# Patient Record
Sex: MALE | Race: WHITE | NOT HISPANIC OR LATINO | Employment: FULL TIME | URBAN - METROPOLITAN AREA
[De-identification: names, ages, dates, MRNs, and addresses within clinical notes are randomized per-mention and may not be internally consistent; named-entity substitution may affect disease eponyms.]

---

## 2017-07-06 ENCOUNTER — HOSPITAL ENCOUNTER (EMERGENCY)
Facility: HOSPITAL | Age: 52
Discharge: HOME | End: 2017-07-06
Attending: EMERGENCY MEDICINE | Admitting: EMERGENCY MEDICINE

## 2017-07-06 VITALS
BODY MASS INDEX: 38.65 KG/M2 | DIASTOLIC BLOOD PRESSURE: 65 MMHG | RESPIRATION RATE: 18 BRPM | OXYGEN SATURATION: 91 % | TEMPERATURE: 98 F | WEIGHT: 270 LBS | SYSTOLIC BLOOD PRESSURE: 123 MMHG | HEART RATE: 83 BPM | HEIGHT: 70 IN

## 2017-07-06 DIAGNOSIS — S61.209A AVULSION OF SKIN OF FINGER, INITIAL ENCOUNTER: ICD-10-CM

## 2017-07-06 DIAGNOSIS — R52 PAIN: ICD-10-CM
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
## 2017-07-06 DIAGNOSIS — S22.32XA CLOSED FRACTURE OF ONE RIB OF LEFT SIDE, INITIAL ENCOUNTER: ICD-10-CM

## 2017-07-06 DIAGNOSIS — M54.9 BACK PAIN, UNSPECIFIED BACK LOCATION, UNSPECIFIED BACK PAIN LATERALITY, UNSPECIFIED CHRONICITY: ICD-10-CM

## 2017-07-06 DIAGNOSIS — S43.102A AC SEPARATION, LEFT, INITIAL ENCOUNTER: ICD-10-CM

## 2017-07-06 DIAGNOSIS — S61.412A LACERATION OF MULTIPLE SITES OF HAND AND FINGERS, LEFT, INITIAL ENCOUNTER: ICD-10-CM
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
## 2017-07-06 DIAGNOSIS — S61.219A LACERATION OF MULTIPLE SITES OF HAND AND FINGERS, LEFT, INITIAL ENCOUNTER: ICD-10-CM

## 2017-07-06 DIAGNOSIS — T07.XXXA MULTIPLE CONTUSIONS: ICD-10-CM

## 2017-07-06 LAB
ABO + RH BLD: NORMAL
ALBUMIN SERPL BCP-MCNC: 3.9 G/DL
ALP SERPL-CCNC: 94 U/L
ALT SERPL W/O P-5'-P-CCNC: 27 U/L
ANION GAP SERPL CALC-SCNC: 7 MMOL/L
APTT BLDCRRT: <21 SEC
AST SERPL-CCNC: 24 U/L
BACTERIA #/AREA URNS AUTO: ABNORMAL /HPF
BASOPHILS # BLD AUTO: 0.07 K/UL
BASOPHILS NFR BLD: 0.7 %
BILIRUB SERPL-MCNC: 0.5 MG/DL
BILIRUB UR QL STRIP: NEGATIVE
BLD GP AB SCN CELLS X3 SERPL QL: NORMAL
BUN SERPL-MCNC: 12 MG/DL (ref 6–30)
BUN SERPL-MCNC: 13 MG/DL
CALCIUM SERPL-MCNC: 9.6 MG/DL
CHLORIDE SERPL-SCNC: 110 MMOL/L (ref 95–110)
CHLORIDE SERPL-SCNC: 112 MMOL/L
CLARITY UR REFRACT.AUTO: ABNORMAL
CO2 SERPL-SCNC: 24 MMOL/L
COLOR UR AUTO: YELLOW
CREAT SERPL-MCNC: 1.3 MG/DL
CREAT SERPL-MCNC: 1.3 MG/DL (ref 0.5–1.4)
DIFFERENTIAL METHOD: ABNORMAL
EOSINOPHIL # BLD AUTO: 0.1 K/UL
EOSINOPHIL NFR BLD: 1.1 %
ERYTHROCYTE [DISTWIDTH] IN BLOOD BY AUTOMATED COUNT: 12.3 %
EST. GFR  (AFRICAN AMERICAN): >60 ML/MIN/1.73 M^2
EST. GFR  (NON AFRICAN AMERICAN): >60 ML/MIN/1.73 M^2
ETHANOL SERPL-MCNC: <10 MG/DL
GLUCOSE SERPL-MCNC: 101 MG/DL
GLUCOSE SERPL-MCNC: 101 MG/DL (ref 70–110)
GLUCOSE UR QL STRIP: NEGATIVE
HCT VFR BLD AUTO: 43.1 %
HCT VFR BLD CALC: 44 %PCV (ref 36–54)
HGB BLD-MCNC: 15 G/DL
HGB UR QL STRIP: ABNORMAL
HYALINE CASTS UR QL AUTO: 0 /LPF
INR PPP: 0.9
KETONES UR QL STRIP: NEGATIVE
LACTATE SERPL-SCNC: 1 MMOL/L
LEUKOCYTE ESTERASE UR QL STRIP: NEGATIVE
LYMPHOCYTES # BLD AUTO: 1.6 K/UL
LYMPHOCYTES NFR BLD: 15.3 %
MAGNESIUM SERPL-MCNC: 2 MG/DL
MCH RBC QN AUTO: 31.7 PG
MCHC RBC AUTO-ENTMCNC: 34.8 %
MCV RBC AUTO: 91 FL
MICROSCOPIC COMMENT: ABNORMAL
MONOCYTES # BLD AUTO: 0.8 K/UL
MONOCYTES NFR BLD: 7.6 %
NEUTROPHILS # BLD AUTO: 7.7 K/UL
NEUTROPHILS NFR BLD: 74.9 %
NITRITE UR QL STRIP: NEGATIVE
PH UR STRIP: 5 [PH] (ref 5–8)
PLATELET # BLD AUTO: 249 K/UL
PMV BLD AUTO: 9.9 FL
POC IONIZED CALCIUM: 1.2 MMOL/L (ref 1.06–1.42)
POC TCO2 (MEASURED): 23 MMOL/L (ref 23–29)
POTASSIUM BLD-SCNC: 4.6 MMOL/L (ref 3.5–5.1)
POTASSIUM SERPL-SCNC: 4.6 MMOL/L
PROT SERPL-MCNC: 7.3 G/DL
PROT UR QL STRIP: ABNORMAL
PROTHROMBIN TIME: 9.9 SEC
RBC # BLD AUTO: 4.73 M/UL
RBC #/AREA URNS AUTO: 5 /HPF (ref 0–4)
SAMPLE: NORMAL
SODIUM BLD-SCNC: 144 MMOL/L (ref 136–145)
SODIUM SERPL-SCNC: 143 MMOL/L
SP GR UR STRIP: >1.03 (ref 1–1.03)
SQUAMOUS #/AREA URNS AUTO: 0 /HPF
TROPONIN I SERPL DL<=0.01 NG/ML-MCNC: <0.006 NG/ML
URN SPEC COLLECT METH UR: ABNORMAL
UROBILINOGEN UR STRIP-ACNC: NEGATIVE EU/DL
WBC # BLD AUTO: 10.29 K/UL
WBC #/AREA URNS AUTO: 3 /HPF (ref 0–5)

## 2017-07-06 PROCEDURE — 12002 RPR S/N/AX/GEN/TRNK2.6-7.5CM: CPT | Mod: ,,, | Performed by: EMERGENCY MEDICINE

## 2017-07-06 PROCEDURE — 85730 THROMBOPLASTIN TIME PARTIAL: CPT

## 2017-07-06 PROCEDURE — 83605 ASSAY OF LACTIC ACID: CPT

## 2017-07-06 PROCEDURE — 86900 BLOOD TYPING SEROLOGIC ABO: CPT

## 2017-07-06 PROCEDURE — 99284 EMERGENCY DEPT VISIT MOD MDM: CPT | Mod: 25,,, | Performed by: EMERGENCY MEDICINE

## 2017-07-06 PROCEDURE — 86901 BLOOD TYPING SEROLOGIC RH(D): CPT

## 2017-07-06 PROCEDURE — 96376 TX/PRO/DX INJ SAME DRUG ADON: CPT

## 2017-07-06 PROCEDURE — 25000003 PHARM REV CODE 250: Performed by: EMERGENCY MEDICINE

## 2017-07-06 PROCEDURE — 83735 ASSAY OF MAGNESIUM: CPT

## 2017-07-06 PROCEDURE — 85610 PROTHROMBIN TIME: CPT

## 2017-07-06 PROCEDURE — 84484 ASSAY OF TROPONIN QUANT: CPT

## 2017-07-06 PROCEDURE — 81001 URINALYSIS AUTO W/SCOPE: CPT

## 2017-07-06 PROCEDURE — 80320 DRUG SCREEN QUANTALCOHOLS: CPT

## 2017-07-06 PROCEDURE — 12002 RPR S/N/AX/GEN/TRNK2.6-7.5CM: CPT

## 2017-07-06 PROCEDURE — 96374 THER/PROPH/DIAG INJ IV PUSH: CPT

## 2017-07-06 PROCEDURE — 80053 COMPREHEN METABOLIC PANEL: CPT

## 2017-07-06 PROCEDURE — 93010 ELECTROCARDIOGRAM REPORT: CPT | Mod: ,,, | Performed by: INTERNAL MEDICINE

## 2017-07-06 PROCEDURE — 85025 COMPLETE CBC W/AUTO DIFF WBC: CPT

## 2017-07-06 PROCEDURE — 99285 EMERGENCY DEPT VISIT HI MDM: CPT | Mod: 25

## 2017-07-06 PROCEDURE — 99900035 HC TECH TIME PER 15 MIN (STAT)

## 2017-07-06 PROCEDURE — 96372 THER/PROPH/DIAG INJ SC/IM: CPT

## 2017-07-06 PROCEDURE — 63600175 PHARM REV CODE 636 W HCPCS: Performed by: EMERGENCY MEDICINE

## 2017-07-06 PROCEDURE — 25500020 PHARM REV CODE 255: Performed by: EMERGENCY MEDICINE

## 2017-07-06 RX ORDER — HYDROCODONE BITARTRATE AND ACETAMINOPHEN 10; 325 MG/1; MG/1
1 TABLET ORAL EVERY 6 HOURS PRN
Qty: 12 TABLET | Refills: 0 | Status: SHIPPED | OUTPATIENT
Start: 2017-07-06 | End: 2018-04-19

## 2017-07-06 RX ORDER — MORPHINE SULFATE 2 MG/ML
4 INJECTION, SOLUTION INTRAMUSCULAR; INTRAVENOUS
Status: COMPLETED | OUTPATIENT
Start: 2017-07-06 | End: 2017-07-06

## 2017-07-06 RX ORDER — CYCLOBENZAPRINE HCL 5 MG
5 TABLET ORAL 3 TIMES DAILY PRN
Qty: 8 TABLET | Refills: 0 | Status: SHIPPED | OUTPATIENT
Start: 2017-07-06 | End: 2017-07-16

## 2017-07-06 RX ORDER — NAPROXEN 500 MG/1
500 TABLET ORAL
Status: COMPLETED | OUTPATIENT
Start: 2017-07-06 | End: 2017-07-06

## 2017-07-06 RX ORDER — MORPHINE SULFATE 2 MG/ML
6 INJECTION, SOLUTION INTRAMUSCULAR; INTRAVENOUS
Status: COMPLETED | OUTPATIENT
Start: 2017-07-06 | End: 2017-07-06

## 2017-07-06 RX ORDER — BACITRACIN ZINC 500 UNIT/G
OINTMENT IN PACKET (EA) TOPICAL
Status: COMPLETED | OUTPATIENT
Start: 2017-07-06 | End: 2017-07-06

## 2017-07-06 RX ORDER — IBUPROFEN 600 MG/1
600 TABLET ORAL EVERY 6 HOURS PRN
Qty: 20 TABLET | Refills: 0 | Status: ON HOLD | OUTPATIENT
Start: 2017-07-06 | End: 2018-05-12 | Stop reason: HOSPADM

## 2017-07-06 RX ORDER — BUPIVACAINE HYDROCHLORIDE 5 MG/ML
15 INJECTION, SOLUTION EPIDURAL; INTRACAUDAL
Status: COMPLETED | OUTPATIENT
Start: 2017-07-06 | End: 2017-07-06

## 2017-07-06 RX ADMIN — MORPHINE SULFATE 4 MG: 2 INJECTION, SOLUTION INTRAMUSCULAR; INTRAVENOUS at 06:07

## 2017-07-06 RX ADMIN — BACITRACIN ZINC: 500 OINTMENT TOPICAL at 10:07

## 2017-07-06 RX ADMIN — NAPROXEN 500 MG: 500 TABLET ORAL at 10:07

## 2017-07-06 RX ADMIN — MORPHINE SULFATE 6 MG: 2 INJECTION, SOLUTION INTRAMUSCULAR; INTRAVENOUS at 08:07

## 2017-07-06 RX ADMIN — IOHEXOL 100 ML: 350 INJECTION, SOLUTION INTRAVENOUS at 07:07

## 2017-07-06 RX ADMIN — BUPIVACAINE HYDROCHLORIDE 75 MG: 5 INJECTION, SOLUTION EPIDURAL; INTRACAUDAL; PERINEURAL at 09:07
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
## 2017-07-06 NOTE — ED NOTES
Patient arrives via EMS with HOB 30 degrees elevated on stretcher, hard - collar in place. HOB decr, flat with immed pain and severe SOB.
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
## 2018-04-19 ENCOUNTER — HOSPITAL ENCOUNTER (EMERGENCY)
Facility: HOSPITAL | Age: 53
Discharge: HOME | End: 2018-04-19
Payer: COMMERCIAL

## 2018-04-19 VITALS
OXYGEN SATURATION: 97 % | WEIGHT: 260 LBS | SYSTOLIC BLOOD PRESSURE: 133 MMHG | BODY MASS INDEX: 37.22 KG/M2 | HEART RATE: 99 BPM | RESPIRATION RATE: 20 BRPM | HEIGHT: 70 IN | TEMPERATURE: 98 F | DIASTOLIC BLOOD PRESSURE: 69 MMHG

## 2018-04-19 DIAGNOSIS — L03.221 CELLULITIS OF NECK: Primary | ICD-10-CM

## 2018-04-19 LAB
ALBUMIN SERPL BCP-MCNC: 3.8 G/DL
ALP SERPL-CCNC: 90 U/L
ALT SERPL W/O P-5'-P-CCNC: 23 U/L
ANION GAP SERPL CALC-SCNC: 7 MMOL/L
AST SERPL-CCNC: 14 U/L
BASOPHILS # BLD AUTO: 0.01 K/UL
BASOPHILS NFR BLD: 0.1 %
BILIRUB SERPL-MCNC: 0.7 MG/DL
BUN SERPL-MCNC: 17 MG/DL
CALCIUM SERPL-MCNC: 9.8 MG/DL
CHLORIDE SERPL-SCNC: 106 MMOL/L
CO2 SERPL-SCNC: 27 MMOL/L
CREAT SERPL-MCNC: 1.2 MG/DL
CRP SERPL-MCNC: 14.9 MG/L
DIFFERENTIAL METHOD: ABNORMAL
EOSINOPHIL # BLD AUTO: 0 K/UL
EOSINOPHIL NFR BLD: 0.2 %
ERYTHROCYTE [DISTWIDTH] IN BLOOD BY AUTOMATED COUNT: 13 %
ERYTHROCYTE [SEDIMENTATION RATE] IN BLOOD BY WESTERGREN METHOD: 9 MM/HR
EST. GFR  (AFRICAN AMERICAN): >60 ML/MIN/1.73 M^2
EST. GFR  (NON AFRICAN AMERICAN): >60 ML/MIN/1.73 M^2
GLUCOSE SERPL-MCNC: 165 MG/DL
HCT VFR BLD AUTO: 42 %
HGB BLD-MCNC: 14.7 G/DL
LYMPHOCYTES # BLD AUTO: 1.3 K/UL
LYMPHOCYTES NFR BLD: 13.3 %
MCH RBC QN AUTO: 31.5 PG
MCHC RBC AUTO-ENTMCNC: 35 G/DL
MCV RBC AUTO: 90 FL
MONOCYTES # BLD AUTO: 0.3 K/UL
MONOCYTES NFR BLD: 3 %
NEUTROPHILS # BLD AUTO: 8.2 K/UL
NEUTROPHILS NFR BLD: 83.4 %
PLATELET # BLD AUTO: 244 K/UL
PMV BLD AUTO: 9.6 FL
POTASSIUM SERPL-SCNC: 4.7 MMOL/L
PROLACTIN SERPL IA-MCNC: 4.9 NG/ML
PROT SERPL-MCNC: 7.1 G/DL
RBC # BLD AUTO: 4.67 M/UL
SODIUM SERPL-SCNC: 140 MMOL/L
WBC # BLD AUTO: 9.78 K/UL

## 2018-04-19 PROCEDURE — 99284 EMERGENCY DEPT VISIT MOD MDM: CPT

## 2018-04-19 PROCEDURE — 85651 RBC SED RATE NONAUTOMATED: CPT

## 2018-04-19 PROCEDURE — 25500020 PHARM REV CODE 255: Performed by: NURSE PRACTITIONER

## 2018-04-19 PROCEDURE — 25000003 PHARM REV CODE 250: Performed by: NURSE PRACTITIONER

## 2018-04-19 PROCEDURE — 86140 C-REACTIVE PROTEIN: CPT

## 2018-04-19 PROCEDURE — 80053 COMPREHEN METABOLIC PANEL: CPT

## 2018-04-19 PROCEDURE — 85025 COMPLETE CBC W/AUTO DIFF WBC: CPT

## 2018-04-19 PROCEDURE — 84146 ASSAY OF PROLACTIN: CPT

## 2018-04-19 RX ORDER — HYDROCODONE BITARTRATE AND ACETAMINOPHEN 10; 325 MG/1; MG/1
1 TABLET ORAL
Status: COMPLETED | OUTPATIENT
Start: 2018-04-19 | End: 2018-04-19

## 2018-04-19 RX ORDER — HYDROCODONE BITARTRATE AND ACETAMINOPHEN 10; 325 MG/1; MG/1
1 TABLET ORAL EVERY 4 HOURS PRN
Qty: 10 TABLET | Refills: 0 | Status: SHIPPED | OUTPATIENT
Start: 2018-04-19 | End: 2019-02-06 | Stop reason: SDUPTHER

## 2018-04-19 RX ORDER — CLINDAMYCIN HYDROCHLORIDE 300 MG/1
300 CAPSULE ORAL 3 TIMES DAILY
Qty: 30 CAPSULE | Refills: 0 | Status: SHIPPED | OUTPATIENT
Start: 2018-04-19 | End: 2018-04-29

## 2018-04-19 RX ADMIN — HYDROCODONE BITARTRATE AND ACETAMINOPHEN 1 TABLET: 10; 325 TABLET ORAL at 02:04

## 2018-04-19 RX ADMIN — IOHEXOL 75 ML: 350 INJECTION, SOLUTION INTRAVENOUS at 03:04

## 2018-04-19 NOTE — ED PROVIDER NOTES
Encounter Date: 4/19/2018       History     Chief Complaint   Patient presents with    Neck Pain     posterior. Pt reports redness, warmth to skin, saw his PCP yesterday for same and was given steroid and muscle relaxer     Mr Sol presents with complaints of neck swelling in the occipital region. The swelling seems to be worse at night and he reports feeling he couldn't breathe last night. He was seen three weeks ago in a separate ER and was given a course of Bactrim. There ws some improvement but yesterday the symptoms worsened. He saw his PCP yesterday and ws given a medrol pack and muscle relaxants. He reports a temperature last night of 101.0.          Review of patient's allergies indicates:  No Known Allergies  Past Medical History:   Diagnosis Date    High cholesterol     Hypertension      Past Surgical History:   Procedure Laterality Date    APPENDECTOMY      BACK SURGERY      FEMUR SURGERY      KNEE SURGERY       History reviewed. No pertinent family history.  Social History   Substance Use Topics    Smoking status: Never Smoker    Smokeless tobacco: Never Used    Alcohol use No     Review of Systems   Constitutional: Positive for fever. Negative for chills and diaphoresis.   HENT: Negative for congestion, ear discharge, ear pain, postnasal drip, sinus pressure and sore throat.    Respiratory: Negative for cough, chest tightness and shortness of breath.    Cardiovascular: Negative for chest pain and palpitations.   Gastrointestinal: Negative for abdominal distention, abdominal pain, diarrhea, nausea and vomiting.   Genitourinary: Negative for difficulty urinating.   Musculoskeletal: Positive for neck pain. Negative for myalgias.   Skin: Positive for color change. Negative for rash and wound.   Allergic/Immunologic: Negative for immunocompromised state.   Neurological: Negative for dizziness, light-headedness and headaches.   Hematological: Does not bruise/bleed easily.   Psychiatric/Behavioral:  Negative for behavioral problems and confusion.       Physical Exam     Initial Vitals [04/19/18 1320]   BP Pulse Resp Temp SpO2   133/69 99 20 98.2 °F (36.8 °C) 97 %      MAP       90.33         Physical Exam    Vitals reviewed.  Constitutional: He appears well-developed and well-nourished.   HENT:   Head: Normocephalic.   Right Ear: Tympanic membrane and ear canal normal.   Left Ear: Tympanic membrane and ear canal normal.   Nose: Nose normal.   Mouth/Throat: Uvula is midline and oropharynx is clear and moist. No oropharyngeal exudate, posterior oropharyngeal edema or posterior oropharyngeal erythema.   Eyes: Conjunctivae are normal. Pupils are equal, round, and reactive to light.   Neck: Normal range of motion. Erythema present. No tracheal deviation present. No neck rigidity.       Erythema and swelling as per drawing. No fluctuance.   Cardiovascular: Normal rate.   Pulmonary/Chest: Breath sounds normal. No respiratory distress.   Musculoskeletal: Normal range of motion.   Neurological: He is alert and oriented to person, place, and time.   Skin: Skin is warm and dry.   Psychiatric: He has a normal mood and affect. Thought content normal.         ED Course   Procedures  Labs Reviewed   C-REACTIVE PROTEIN - Abnormal; Notable for the following:        Result Value    CRP 14.9 (*)     All other components within normal limits   COMPREHENSIVE METABOLIC PANEL - Abnormal; Notable for the following:     Glucose 165 (*)     Anion Gap 7 (*)     All other components within normal limits   CBC W/ AUTO DIFFERENTIAL - Abnormal; Notable for the following:     MCH 31.5 (*)     Gran # (ANC) 8.2 (*)     Gran% 83.4 (*)     Lymph% 13.3 (*)     Mono% 3.0 (*)     All other components within normal limits   SEDIMENTATION RATE, MANUAL   PROLACTIN            4:42 PM: Discussed with pt all pertinent ED information and results. Discussed pt dx and plan of tx. Gave pt all f/u and return to the ED instructions. All questions and concerns  were addressed at this time. Pt expresses understanding of information and instructions, and is comfortable with plan to discharge. Pt is stable for discharge.    I discussed with patient and/or family/caretaker that evaluation in the ED does not suggest any emergent or life threatening medical conditions requiring immediate intervention beyond what was provided in the ED, and I believe patient is safe for discharge.  Regardless, an unremarkable evaluation in the ED does not preclude the development or presence of a serious of life threatening condition. As such, patient was instructed to return immediately for any worsening or change in current symptoms.  Medical Decision Making:   Clinical Tests:   Lab Tests: Ordered and Reviewed  Radiological Study: Reviewed and Ordered                      Clinical Impression:   The encounter diagnosis was Cellulitis of neck.    Disposition:   Disposition: Discharged  Condition: Stable                        WM Davis  04/25/18 0811

## 2018-05-11 ENCOUNTER — HOSPITAL ENCOUNTER (INPATIENT)
Facility: HOSPITAL | Age: 53
LOS: 1 days | Discharge: HOME | DRG: 603 | End: 2018-05-12
Attending: HOSPITALIST | Admitting: HOSPITALIST
Payer: COMMERCIAL

## 2018-05-11 DIAGNOSIS — L03.116 CELLULITIS OF LEFT LOWER LEG: Primary | ICD-10-CM

## 2018-05-11 LAB
ANION GAP SERPL CALC-SCNC: 11 MMOL/L
BASOPHILS # BLD AUTO: 0.04 K/UL
BASOPHILS NFR BLD: 0.4 %
BUN SERPL-MCNC: 14 MG/DL
CALCIUM SERPL-MCNC: 9.3 MG/DL
CHLORIDE SERPL-SCNC: 109 MMOL/L
CO2 SERPL-SCNC: 20 MMOL/L
CREAT SERPL-MCNC: 1.3 MG/DL
CRP SERPL-MCNC: 6 MG/L
DIFFERENTIAL METHOD: ABNORMAL
EOSINOPHIL # BLD AUTO: 0.5 K/UL
EOSINOPHIL NFR BLD: 4.7 %
ERYTHROCYTE [DISTWIDTH] IN BLOOD BY AUTOMATED COUNT: 13.1 %
ERYTHROCYTE [SEDIMENTATION RATE] IN BLOOD BY WESTERGREN METHOD: 36 MM/HR
EST. GFR  (AFRICAN AMERICAN): >60 ML/MIN/1.73 M^2
EST. GFR  (NON AFRICAN AMERICAN): >60 ML/MIN/1.73 M^2
GLUCOSE SERPL-MCNC: 122 MG/DL
HCT VFR BLD AUTO: 39.8 %
HGB BLD-MCNC: 13.9 G/DL
LACTATE SERPL-SCNC: 1.7 MMOL/L
LYMPHOCYTES # BLD AUTO: 2.8 K/UL
LYMPHOCYTES NFR BLD: 27.8 %
MCH RBC QN AUTO: 31.3 PG
MCHC RBC AUTO-ENTMCNC: 34.9 G/DL
MCV RBC AUTO: 90 FL
MONOCYTES # BLD AUTO: 0.6 K/UL
MONOCYTES NFR BLD: 6.1 %
NEUTROPHILS # BLD AUTO: 6.1 K/UL
NEUTROPHILS NFR BLD: 61 %
PLATELET # BLD AUTO: 239 K/UL
PMV BLD AUTO: 9.6 FL
POTASSIUM SERPL-SCNC: 4.5 MMOL/L
RBC # BLD AUTO: 4.44 M/UL
SODIUM SERPL-SCNC: 140 MMOL/L
WBC # BLD AUTO: 10.02 K/UL

## 2018-05-11 PROCEDURE — 87040 BLOOD CULTURE FOR BACTERIA: CPT | Mod: 59

## 2018-05-11 PROCEDURE — 63600175 PHARM REV CODE 636 W HCPCS: Performed by: PHYSICIAN ASSISTANT

## 2018-05-11 PROCEDURE — 80048 BASIC METABOLIC PNL TOTAL CA: CPT

## 2018-05-11 PROCEDURE — 83605 ASSAY OF LACTIC ACID: CPT

## 2018-05-11 PROCEDURE — 11000001 HC ACUTE MED/SURG PRIVATE ROOM

## 2018-05-11 PROCEDURE — 25000003 PHARM REV CODE 250: Performed by: PHYSICIAN ASSISTANT

## 2018-05-11 PROCEDURE — 86141 C-REACTIVE PROTEIN HS: CPT

## 2018-05-11 PROCEDURE — 96366 THER/PROPH/DIAG IV INF ADDON: CPT

## 2018-05-11 PROCEDURE — 99284 EMERGENCY DEPT VISIT MOD MDM: CPT | Mod: 25

## 2018-05-11 PROCEDURE — 96365 THER/PROPH/DIAG IV INF INIT: CPT

## 2018-05-11 PROCEDURE — 85651 RBC SED RATE NONAUTOMATED: CPT

## 2018-05-11 PROCEDURE — 86140 C-REACTIVE PROTEIN: CPT

## 2018-05-11 PROCEDURE — 85610 PROTHROMBIN TIME: CPT

## 2018-05-11 PROCEDURE — 85025 COMPLETE CBC W/AUTO DIFF WBC: CPT

## 2018-05-11 RX ORDER — ONDANSETRON 8 MG/1
8 TABLET, ORALLY DISINTEGRATING ORAL EVERY 8 HOURS PRN
Status: DISCONTINUED | OUTPATIENT
Start: 2018-05-12 | End: 2018-05-12 | Stop reason: HOSPADM

## 2018-05-11 RX ORDER — HYDROCODONE BITARTRATE AND ACETAMINOPHEN 10; 325 MG/1; MG/1
1 TABLET ORAL EVERY 6 HOURS PRN
Status: DISCONTINUED | OUTPATIENT
Start: 2018-05-12 | End: 2018-05-12 | Stop reason: HOSPADM

## 2018-05-11 RX ORDER — GLUCAGON 1 MG
1 KIT INJECTION
Status: DISCONTINUED | OUTPATIENT
Start: 2018-05-12 | End: 2018-05-12 | Stop reason: HOSPADM

## 2018-05-11 RX ORDER — ACETAMINOPHEN 325 MG/1
650 TABLET ORAL EVERY 4 HOURS PRN
Status: DISCONTINUED | OUTPATIENT
Start: 2018-05-12 | End: 2018-05-12 | Stop reason: HOSPADM

## 2018-05-11 RX ORDER — SODIUM CHLORIDE 0.9 % (FLUSH) 0.9 %
5 SYRINGE (ML) INJECTION
Status: DISCONTINUED | OUTPATIENT
Start: 2018-05-12 | End: 2018-05-12 | Stop reason: HOSPADM

## 2018-05-11 RX ORDER — ONDANSETRON 2 MG/ML
4 INJECTION INTRAMUSCULAR; INTRAVENOUS EVERY 8 HOURS PRN
Status: DISCONTINUED | OUTPATIENT
Start: 2018-05-12 | End: 2018-05-12 | Stop reason: HOSPADM

## 2018-05-11 RX ORDER — HYDROCODONE BITARTRATE AND ACETAMINOPHEN 5; 325 MG/1; MG/1
1 TABLET ORAL EVERY 6 HOURS PRN
Status: DISCONTINUED | OUTPATIENT
Start: 2018-05-12 | End: 2018-05-12 | Stop reason: HOSPADM

## 2018-05-11 RX ORDER — MORPHINE SULFATE 4 MG/ML
4 INJECTION, SOLUTION INTRAMUSCULAR; INTRAVENOUS EVERY 4 HOURS PRN
Status: DISCONTINUED | OUTPATIENT
Start: 2018-05-12 | End: 2018-05-11

## 2018-05-11 RX ORDER — IPRATROPIUM BROMIDE AND ALBUTEROL SULFATE 2.5; .5 MG/3ML; MG/3ML
3 SOLUTION RESPIRATORY (INHALATION) EVERY 6 HOURS PRN
Status: DISCONTINUED | OUTPATIENT
Start: 2018-05-12 | End: 2018-05-12 | Stop reason: HOSPADM

## 2018-05-11 RX ORDER — IBUPROFEN 200 MG
24 TABLET ORAL
Status: DISCONTINUED | OUTPATIENT
Start: 2018-05-12 | End: 2018-05-12 | Stop reason: HOSPADM

## 2018-05-11 RX ORDER — IBUPROFEN 200 MG
16 TABLET ORAL
Status: DISCONTINUED | OUTPATIENT
Start: 2018-05-12 | End: 2018-05-12 | Stop reason: HOSPADM

## 2018-05-11 RX ADMIN — SODIUM CHLORIDE 1500 MG: 900 INJECTION, SOLUTION INTRAVENOUS at 09:05

## 2018-05-12 VITALS
HEIGHT: 70 IN | DIASTOLIC BLOOD PRESSURE: 73 MMHG | BODY MASS INDEX: 37.22 KG/M2 | OXYGEN SATURATION: 95 % | RESPIRATION RATE: 18 BRPM | HEART RATE: 76 BPM | SYSTOLIC BLOOD PRESSURE: 131 MMHG | WEIGHT: 260 LBS | TEMPERATURE: 97 F

## 2018-05-12 PROBLEM — I10 ESSENTIAL HYPERTENSION: Status: ACTIVE | Noted: 2018-05-12

## 2018-05-12 PROBLEM — G62.9 NEUROPATHY: Status: ACTIVE | Noted: 2018-05-12

## 2018-05-12 PROBLEM — M62.838 MUSCLE SPASMS OF LOWER EXTREMITY: Status: ACTIVE | Noted: 2018-05-12

## 2018-05-12 PROBLEM — E78.5 HYPERLIPIDEMIA: Status: ACTIVE | Noted: 2018-05-12

## 2018-05-12 LAB
ALBUMIN SERPL BCP-MCNC: 3.2 G/DL
AMPHET+METHAMPHET UR QL: NEGATIVE
ANION GAP SERPL CALC-SCNC: 8 MMOL/L
BARBITURATES UR QL SCN>200 NG/ML: NEGATIVE
BASOPHILS # BLD AUTO: 0.09 K/UL
BASOPHILS NFR BLD: 1.1 %
BENZODIAZ UR QL SCN>200 NG/ML: NEGATIVE
BUN SERPL-MCNC: 15 MG/DL
BZE UR QL SCN: NEGATIVE
CALCIUM SERPL-MCNC: 9 MG/DL
CANNABINOIDS UR QL SCN: NEGATIVE
CHLORIDE SERPL-SCNC: 111 MMOL/L
CO2 SERPL-SCNC: 22 MMOL/L
CREAT SERPL-MCNC: 1.2 MG/DL
CREAT UR-MCNC: 241.9 MG/DL
CRP SERPL-MCNC: 5.64 MG/L
DIFFERENTIAL METHOD: ABNORMAL
EOSINOPHIL # BLD AUTO: 0.6 K/UL
EOSINOPHIL NFR BLD: 6.7 %
ERYTHROCYTE [DISTWIDTH] IN BLOOD BY AUTOMATED COUNT: 13.2 %
EST. GFR  (AFRICAN AMERICAN): >60 ML/MIN/1.73 M^2
EST. GFR  (NON AFRICAN AMERICAN): >60 ML/MIN/1.73 M^2
GLUCOSE SERPL-MCNC: 110 MG/DL
HCT VFR BLD AUTO: 38.3 %
HGB BLD-MCNC: 13 G/DL
INR PPP: 0.9
LYMPHOCYTES # BLD AUTO: 2.7 K/UL
LYMPHOCYTES NFR BLD: 33.5 %
MAGNESIUM SERPL-MCNC: 2.1 MG/DL
MCH RBC QN AUTO: 30.7 PG
MCHC RBC AUTO-ENTMCNC: 33.9 G/DL
MCV RBC AUTO: 91 FL
METHADONE UR QL SCN>300 NG/ML: NEGATIVE
MONOCYTES # BLD AUTO: 0.7 K/UL
MONOCYTES NFR BLD: 8.2 %
NEUTROPHILS # BLD AUTO: 4.1 K/UL
NEUTROPHILS NFR BLD: 50.5 %
OPIATES UR QL SCN: NORMAL
PCP UR QL SCN>25 NG/ML: NEGATIVE
PHOSPHATE SERPL-MCNC: 3.6 MG/DL
PLATELET # BLD AUTO: 242 K/UL
PMV BLD AUTO: 9.3 FL
POTASSIUM SERPL-SCNC: 3.8 MMOL/L
PROCALCITONIN SERPL IA-MCNC: 0.05 NG/ML
PROTHROMBIN TIME: 9.8 SEC
RBC # BLD AUTO: 4.23 M/UL
SODIUM SERPL-SCNC: 141 MMOL/L
TOXICOLOGY INFORMATION: NORMAL
WBC # BLD AUTO: 8.19 K/UL

## 2018-05-12 PROCEDURE — 85025 COMPLETE CBC W/AUTO DIFF WBC: CPT

## 2018-05-12 PROCEDURE — 25000003 PHARM REV CODE 250: Performed by: INTERNAL MEDICINE

## 2018-05-12 PROCEDURE — 84145 PROCALCITONIN (PCT): CPT

## 2018-05-12 PROCEDURE — 25000003 PHARM REV CODE 250: Performed by: NURSE PRACTITIONER

## 2018-05-12 PROCEDURE — 80069 RENAL FUNCTION PANEL: CPT

## 2018-05-12 PROCEDURE — 80307 DRUG TEST PRSMV CHEM ANLYZR: CPT

## 2018-05-12 PROCEDURE — 83735 ASSAY OF MAGNESIUM: CPT

## 2018-05-12 PROCEDURE — 25000003 PHARM REV CODE 250: Performed by: HOSPITALIST

## 2018-05-12 PROCEDURE — 63600175 PHARM REV CODE 636 W HCPCS: Performed by: INTERNAL MEDICINE

## 2018-05-12 PROCEDURE — 36415 COLL VENOUS BLD VENIPUNCTURE: CPT

## 2018-05-12 RX ORDER — METOPROLOL SUCCINATE 25 MG/1
50 TABLET, EXTENDED RELEASE ORAL DAILY
COMMUNITY
End: 2019-02-06 | Stop reason: SDUPTHER

## 2018-05-12 RX ORDER — DIPHENHYDRAMINE HCL 25 MG
25 CAPSULE ORAL EVERY 6 HOURS PRN
Status: DISCONTINUED | OUTPATIENT
Start: 2018-05-12 | End: 2018-05-12 | Stop reason: HOSPADM

## 2018-05-12 RX ORDER — ASPIRIN 81 MG/1
81 TABLET ORAL DAILY
COMMUNITY
End: 2019-03-19

## 2018-05-12 RX ORDER — LISINOPRIL 20 MG/1
20 TABLET ORAL DAILY
COMMUNITY
End: 2019-02-06 | Stop reason: SDUPTHER

## 2018-05-12 RX ORDER — GABAPENTIN 100 MG/1
300 CAPSULE ORAL 3 TIMES DAILY
COMMUNITY
End: 2019-02-06 | Stop reason: SDUPTHER

## 2018-05-12 RX ORDER — TIZANIDINE 4 MG/1
4 TABLET ORAL 2 TIMES DAILY
COMMUNITY

## 2018-05-12 RX ORDER — GABAPENTIN 300 MG/1
300 CAPSULE ORAL 3 TIMES DAILY
Status: DISCONTINUED | OUTPATIENT
Start: 2018-05-12 | End: 2018-05-12 | Stop reason: HOSPADM

## 2018-05-12 RX ORDER — CLINDAMYCIN HYDROCHLORIDE 300 MG/1
300 CAPSULE ORAL EVERY 6 HOURS
Status: ON HOLD | COMMUNITY
End: 2018-05-12 | Stop reason: HOSPADM

## 2018-05-12 RX ORDER — MELOXICAM 7.5 MG/1
7.5 TABLET ORAL 2 TIMES DAILY
Status: ON HOLD | COMMUNITY
End: 2018-05-12

## 2018-05-12 RX ORDER — METOPROLOL SUCCINATE 50 MG/1
50 TABLET, EXTENDED RELEASE ORAL DAILY
Status: DISCONTINUED | OUTPATIENT
Start: 2018-05-12 | End: 2018-05-12 | Stop reason: HOSPADM

## 2018-05-12 RX ORDER — ATORVASTATIN CALCIUM 40 MG/1
40 TABLET, FILM COATED ORAL NIGHTLY
Status: DISCONTINUED | OUTPATIENT
Start: 2018-05-12 | End: 2018-05-12 | Stop reason: HOSPADM

## 2018-05-12 RX ORDER — TIZANIDINE 4 MG/1
4 TABLET ORAL 2 TIMES DAILY
Status: DISCONTINUED | OUTPATIENT
Start: 2018-05-12 | End: 2018-05-12 | Stop reason: HOSPADM

## 2018-05-12 RX ORDER — ASPIRIN 81 MG/1
81 TABLET ORAL DAILY
Status: DISCONTINUED | OUTPATIENT
Start: 2018-05-12 | End: 2018-05-12 | Stop reason: HOSPADM

## 2018-05-12 RX ORDER — SULFAMETHOXAZOLE AND TRIMETHOPRIM 800; 160 MG/1; MG/1
1 TABLET ORAL 2 TIMES DAILY
Qty: 14 TABLET | Refills: 0 | Status: SHIPPED | OUTPATIENT
Start: 2018-05-12 | End: 2018-05-19

## 2018-05-12 RX ORDER — AMITRIPTYLINE HYDROCHLORIDE 25 MG/1
25 TABLET, FILM COATED ORAL NIGHTLY
Status: DISCONTINUED | OUTPATIENT
Start: 2018-05-12 | End: 2018-05-12 | Stop reason: HOSPADM

## 2018-05-12 RX ORDER — ATORVASTATIN CALCIUM 40 MG/1
40 TABLET, FILM COATED ORAL NIGHTLY
COMMUNITY
End: 2019-02-06 | Stop reason: SDUPTHER

## 2018-05-12 RX ORDER — MELOXICAM 7.5 MG/1
7.5 TABLET ORAL DAILY PRN
Start: 2018-05-12 | End: 2019-02-06 | Stop reason: SDUPTHER

## 2018-05-12 RX ORDER — LISINOPRIL 20 MG/1
20 TABLET ORAL DAILY
Status: DISCONTINUED | OUTPATIENT
Start: 2018-05-12 | End: 2018-05-12 | Stop reason: HOSPADM

## 2018-05-12 RX ORDER — AMITRIPTYLINE HYDROCHLORIDE 25 MG/1
25 TABLET, FILM COATED ORAL NIGHTLY
COMMUNITY

## 2018-05-12 RX ADMIN — HYDROCODONE BITARTRATE AND ACETAMINOPHEN 1 TABLET: 10; 325 TABLET ORAL at 02:05

## 2018-05-12 RX ADMIN — ATORVASTATIN CALCIUM 40 MG: 40 TABLET, FILM COATED ORAL at 02:05

## 2018-05-12 RX ADMIN — GABAPENTIN 300 MG: 300 CAPSULE ORAL at 08:05

## 2018-05-12 RX ADMIN — METOPROLOL SUCCINATE 50 MG: 50 TABLET, EXTENDED RELEASE ORAL at 08:05

## 2018-05-12 RX ADMIN — TIZANIDINE 4 MG: 4 TABLET ORAL at 08:05

## 2018-05-12 RX ADMIN — LISINOPRIL 20 MG: 20 TABLET ORAL at 08:05

## 2018-05-12 RX ADMIN — AMITRIPTYLINE HYDROCHLORIDE 25 MG: 25 TABLET, FILM COATED ORAL at 02:05

## 2018-05-12 RX ADMIN — GABAPENTIN 300 MG: 300 CAPSULE ORAL at 02:05

## 2018-05-12 RX ADMIN — ASPIRIN 81 MG: 81 TABLET, COATED ORAL at 08:05

## 2018-05-12 RX ADMIN — HYDROCODONE BITARTRATE AND ACETAMINOPHEN 1 TABLET: 10; 325 TABLET ORAL at 08:05

## 2018-05-12 RX ADMIN — DIPHENHYDRAMINE HYDROCHLORIDE 25 MG: 25 CAPSULE ORAL at 02:05

## 2018-05-12 RX ADMIN — SODIUM CHLORIDE 1250 MG: 900 INJECTION, SOLUTION INTRAVENOUS at 08:05

## 2018-05-12 NOTE — ASSESSMENT & PLAN NOTE
- complicated, patients reports worsened even with PO clindamycin  - nonpurulent    - check XR to see if involving knee joint  - order ESR, CRP  - continue vancomycin with pharm to dose

## 2018-05-12 NOTE — DISCHARGE SUMMARY
"[facility] Hospital Medicine  Discharge Summary      Patient Name: Milton Sol  MRN: [id number]  Admission Date: 5/11/2018  Hospital Length of Stay: 1 days  Discharge Date and Time:  05/12/2018 1:56 PM  Attending Physician: Karan Denton MD   Discharging Provider: Mary Irby NP  Primary Care Provider: Primary Doctor No      HPI:   53M h/o HLD, HTN, and neuropathy presents with LLE infection.  He reports skin infection started 4 days ago when he noticed some redness, swelling and pain on his left kneecap.   He went to urgent care at [facility] ont he same day and was diagnosed with abscess s/p I/D.  He was prescribed clindamycin 300mg PO q6h, which patient reports he has been compliant on for the past 4 days.  Rash has extended to include his left shin.   Associated with subjective f/c.  No n/v, c/d, ab pain.  No other associated symptoms.  No other exacerbating or alleviating factors.   In Er, several excoriations are found on his left shin, even though patient denies scratching it.  VSS. WBC 10.. Patient was given vancomycin 1.5g bolus in ER.  Hospital medicine called for admission.     * No surgery found *      Hospital Course:   On 5/11 patient was admitted to Medicine secondary to LLE Cellulitis.  Patient denies any known injury, but reports "scratching my leg a lot after it got irritated from the sheets I slept on".  Also reports a h/o "picking at skin".  Patient reports I and D of "left knee at [facility] 4 days ago and taking 4 or so days of Clindamycin QID with no improvement".  [facility] records reviewed in Care Everywhere, and no wound culture is available for review.  He was started on Vancomycin here.  BC drawn and show NGTD.  According to wife at  and review of prior pictures, erythema and edema has markedly improved overnight.  Patient has full ROM of LLE.  Reports some pain with flexion, but is "tolerable".  No pus or drainage expressed from prior I and D site.  Minimal tenderness " to palpation.  Left knee soft to touch with No induration noted on exam.  Xray showed No bony or joint abnormality is seen.  No evidence of soft tissue air or foreign body.  Patient has remained afebrile with no leukocytosis.  Lactic acid 1.7.  Procalcitonin 0.05.  LLE US negative for DVT.  Case d/w Dr. Denton.  Patient seen and examined and deemed medically stable for DC home today.  He will complete 7 days of Bactrim DS and was also instructed to take Hibiclens bath daily x 7 days.  Also instructed to f/u with PCP within 3 days and verbalized + understanding.  Medications reconciled for DC.                  Consults:   Consults         Status Ordering Provider     Pharmacy to dose Vancomycin consult  Once     Provider:  (Not yet assigned)    Acknowledged ELEANOR NAVARRETE          No new Assessment & Plan notes have been filed under this hospital service since the last note was generated.  Service: Hospital Medicine    Final Active Diagnoses:    Diagnosis Date Noted POA    PRINCIPAL PROBLEM:  Cellulitis of left lower leg [L03.116] 05/11/2018 Yes    Hyperlipidemia [E78.5] 05/12/2018 Yes    Neuropathy [G62.9] 05/12/2018 Yes    Essential hypertension [I10] 05/12/2018 Yes    Muscle spasms of lower extremity [M62.838] 05/12/2018 Yes      Problems Resolved During this Admission:    Diagnosis Date Noted Date Resolved POA       Discharged Condition: stable    Disposition: Home or Self Care    Follow Up:  Follow-up Information     Nain Jin MD In 3 days.    Specialty:  Family Medicine  Why:  F/U with PCP for post hospital wound check in 3 days  Contact information:  [id number] [facility] [id number] [phone]                 Patient Instructions:     Diet Adult Regular     Activity as tolerated     Notify your health care provider if you experience any of the following:  temperature >100.4     Notify your health care provider if you experience any of the following:   severe uncontrolled pain     Notify your health care provider if you experience any of the following:  redness, tenderness, or signs of infection (pain, swelling, redness, odor or green/yellow discharge around incision site)     Notify your health care provider if you experience any of the following:  worsening rash         Significant Diagnostic Studies: Labs:   BMP:     Recent Labs  Lab 05/11/18 2122 05/12/18  0434   * 110    141   K 4.5 3.8    111*   CO2 20* 22*   BUN 14 15   CREATININE 1.3 1.2   CALCIUM 9.3 9.0   MG  --  2.1    and CBC     Recent Labs  Lab 05/11/18 2122 05/12/18  0434   WBC 10.02 8.19   HGB 13.9* 13.0*   HCT 39.8* 38.3*    242     Microbiology: Blood Cultures Pending; will f/u on results and notify patient if positive.     Pending Diagnostic Studies:     Procedure Component Value Units Date/Time    Phosphorus [[id number]]     Order Status:  Sent Lab Status:  No result     Specimen:  Blood from Blood          Imaging Results          X-Ray Knee 1 or 2 View Left (Final result)  Result time 05/11/18 23:46:17    Final result by Johnny Martinez MD (05/11/18 23:46:17)                 Impression:      Negative      Electronically signed by: Johnny Martinez MD  Date:    05/11/2018  Time:    23:46             Narrative:    EXAMINATION:  XR KNEE 1 OR 2 VIEW LEFT    CLINICAL HISTORY:  left knee swelling/cellulitis;    COMPARISON:  None    FINDINGS:  No bony or joint abnormality is seen.  No evidence of soft tissue air or foreign body.                                Medications:  Reconciled Home Medications:      Medication List      START taking these medications    sulfamethoxazole-trimethoprim 800-160mg 800-160 mg Tab  Commonly known as:  BACTRIM DS  Take 1 tablet by mouth 2 (two) times daily.        CHANGE how you take these medications    meloxicam 7.5 MG tablet  Commonly known as:  MOBIC  Take 1 tablet (7.5 mg total) by mouth daily as needed for Pain.  What changed:  · when  to take this  · reasons to take this        CONTINUE taking these medications    amitriptyline 25 MG tablet  Commonly known as:  ELAVIL  Take 25 mg by mouth every evening.     aspirin 81 MG EC tablet  Commonly known as:  ECOTRIN  Take 81 mg by mouth once daily.     atorvastatin 40 MG tablet  Commonly known as:  LIPITOR  Take 40 mg by mouth nightly.     gabapentin 100 MG capsule  Commonly known as:  NEURONTIN  Take 300 mg by mouth 3 (three) times daily.     hydrocodone-acetaminophen 10-325mg  mg Tab  Commonly known as:  NORCO  Take 1 tablet by mouth every 4 (four) hours as needed for Pain.     lisinopril 20 MG tablet  Commonly known as:  PRINIVIL,ZESTRIL  Take 20 mg by mouth once daily.     metoprolol succinate 25 MG 24 hr tablet  Commonly known as:  TOPROL-XL  Take 50 mg by mouth once daily.     multivitamin capsule  Take 1 capsule by mouth once daily.     tiZANidine 4 MG tablet  Commonly known as:  ZANAFLEX  Take 4 mg by mouth 2 (two) times daily.        STOP taking these medications    clindamycin 300 MG capsule  Commonly known as:  CLEOCIN     ibuprofen 600 MG tablet  Commonly known as:  ADVIL,MOTRIN            Indwelling Lines/Drains at time of discharge:   Lines/Drains/Airways          No matching active lines, drains, or airways          Time spent on the discharge of patient: 45 minutes  Patient was seen and examined on the date of discharge and determined to be suitable for discharge.         Mary Irby, JULIEN, ACNP-BC  Department of Hospital Medicine  [facility]

## 2018-05-12 NOTE — HPI
53M h/o HLD, HTN, and neuropathy presents with LLE infection.  He reports skin infection started 4 days ago when he noticed some redness, swelling and pain on his left kneecap.   He went to urgent care at [facility] ont he same day and was diagnosed with abscess s/p I/D.  He was prescribed clindamycin 300mg PO q6h, which patient reports he has been compliant on for the past 4 days.  Rash has extended to include his left shin.   Associated with subjective f/c.  No n/v, c/d, ab pain.  No other associated symptoms.  No other exacerbating or alleviating factors.   In Er, several excoriations are found on his left shin, even though patient denies scratching it.  VSS. WBC 10.. Patient was given vancomycin 1.5g bolus in ER.  Hospital medicine called for admission.

## 2018-05-12 NOTE — SUBJECTIVE & OBJECTIVE
Past Medical History:   Diagnosis Date    High cholesterol     Hypertension        Past Surgical History:   Procedure Laterality Date    APPENDECTOMY      BACK SURGERY      FEMUR SURGERY      KNEE SURGERY         Review of patient's allergies indicates:  No Known Allergies    No current facility-administered medications on file prior to encounter.      Current Outpatient Prescriptions on File Prior to Encounter   Medication Sig    hydrocodone-acetaminophen 10-325mg (NORCO)  mg Tab Take 1 tablet by mouth every 4 (four) hours as needed for Pain.    ibuprofen (ADVIL,MOTRIN) 600 MG tablet Take 1 tablet (600 mg total) by mouth every 6 (six) hours as needed for Pain.     Family History     None        Social History Main Topics    Smoking status: Former Smoker     Types: Cigarettes    Smokeless tobacco: Never Used    Alcohol use No    Drug use: No    Sexual activity: Not on file     Review of Systems   Constitutional: Positive for chills (subjective) and fever (subjective). Negative for activity change, appetite change, diaphoresis and fatigue.   HENT: Negative for facial swelling, sore throat, tinnitus and trouble swallowing.    Eyes: Negative for photophobia and visual disturbance.   Respiratory: Negative for apnea, cough, chest tightness, shortness of breath and wheezing.    Cardiovascular: Negative for chest pain, palpitations and leg swelling.   Gastrointestinal: Negative for abdominal distention, abdominal pain, constipation, diarrhea, nausea and vomiting.   Endocrine: Negative for polydipsia, polyphagia and polyuria.   Genitourinary: Negative for decreased urine volume, dysuria, flank pain, frequency and hematuria.   Musculoskeletal: Negative for arthralgias, back pain, joint swelling, myalgias and neck stiffness.   Skin: Positive for rash (LLE). Negative for pallor.   Allergic/Immunologic: Negative for immunocompromised state.   Neurological: Negative for dizziness, seizures, syncope, weakness,  numbness and headaches.   Psychiatric/Behavioral: Negative for confusion, hallucinations and suicidal ideas. The patient is not nervous/anxious.    All other systems reviewed and are negative.    Objective:     Vital Signs (Most Recent):  Temp: 99 °F (37.2 °C) (05/12/18 0011)  Pulse: 98 (05/12/18 0011)  Resp: 18 (05/12/18 0011)  BP: (!) 159/75 (05/12/18 0011)  SpO2: 97 % (05/12/18 0011) Vital Signs (24h Range):  Temp:  [98.8 °F (37.1 °C)-99 °F (37.2 °C)] 99 °F (37.2 °C)  Pulse:  [89-98] 98  Resp:  [18-20] 18  SpO2:  [97 %] 97 %  BP: (138-159)/(71-93) 159/75     Weight: 117.9 kg (260 lb)  Body mass index is 37.31 kg/m².    Physical Exam   Constitutional: He is oriented to person, place, and time. He appears well-developed and well-nourished. No distress.   HENT:   Head: Normocephalic and atraumatic.   Mouth/Throat: Oropharynx is clear and moist.   Eyes: Conjunctivae are normal. Pupils are equal, round, and reactive to light. No scleral icterus.   Neck: No JVD present. No thyromegaly present.   Cardiovascular: Regular rhythm.  Exam reveals no gallop and no friction rub.    No murmur heard.  Pulmonary/Chest: Effort normal and breath sounds normal. No respiratory distress. He has no wheezes. He has no rales.   Abdominal: Soft. Bowel sounds are normal. He exhibits no distension. There is no tenderness. There is no guarding.   Musculoskeletal: Normal range of motion.   Neurological: He is alert and oriented to person, place, and time. No cranial nerve deficit.   Skin: Skin is warm. Capillary refill takes 2 to 3 seconds. Rash (erythema of left knee cap and shin.  Several excoriations of left shin is present.  No joint swelling of left knee present. ) noted. He is not diaphoretic. No erythema.   Psychiatric: He has a normal mood and affect.   Nursing note and vitals reviewed.        CRANIAL NERVES     CN III, IV, VI   Pupils are equal, round, and reactive to light.       Significant Labs:   BMP:   Recent Labs  Lab  05/11/18 2122   *      K 4.5      CO2 20*   BUN 14   CREATININE 1.3   CALCIUM 9.3     CBC:   Recent Labs  Lab 05/11/18 2122   WBC 10.02   HGB 13.9*   HCT 39.8*        All pertinent labs within the past 24 hours have been reviewed.    Significant Imaging: I have reviewed all pertinent imaging results/findings within the past 24 hours.

## 2018-05-12 NOTE — HOSPITAL COURSE
"On 5/11 patient was admitted to Medicine secondary to LLE Cellulitis.  Patient denies any known injury, but reports "scratching my leg a lot after it got irritated from the sheets I slept on".  Also reports a h/o "picking at skin".  Patient reports I and D of "left knee at [facility] 4 days ago and taking 4 or so days of Clindamycin QID with no improvement".  [facility] records reviewed in Care Everywhere, and no wound culture is available for review.  He was started on Vancomycin here.  BC drawn and show NGTD.  According to wife at  and review of prior pictures, erythema and edema has markedly improved overnight.  Patient has full ROM of LLE.  Reports some pain with flexion, but is "tolerable".  No pus or drainage expressed from prior I and D site.  Minimal tenderness to palpation.  Left knee soft to touch with No induration noted on exam.  Xray showed No bony or joint abnormality is seen.  No evidence of soft tissue air or foreign body.  Patient has remained afebrile with no leukocytosis.  Lactic acid 1.7.  Procalcitonin.....  LLE US negative for DVT.  Case d/w Dr. Denton.  Patient seen and examined and deemed medically stable for DC home today.  He will complete 7 days of Bactrim DS and was also instructed to take Hibiclens bath daily x 7 days.  Also instructed to f/u with PCP within 3 days and verbalized + understanding.  Medications reconciled for DC.   "

## 2018-05-12 NOTE — PLAN OF CARE
Problem: Patient Care Overview  Goal: Plan of Care Review  Outcome: Outcome(s) achieved Date Met: 05/12/18  Discharge instructions reviewed with patient and spouse.   Medication sent to pharmacy.  Follow up appointments scheduled and discussed.  Questions answered. No further needs.

## 2018-05-12 NOTE — CONSULTS
Milton Sol [id number] is a 53 y.o. male who has been consulted for vancomycin dosing.  Dx: skin and soft tissue / goal trough 10-15  The patient has the following labs:     Date Creatinine (mg/dl)    BUN WBC Count   5/12/2018 Estimated Creatinine Clearance: 84.6 mL/min (based on SCr of 1.3 mg/dL). Lab Results   Component Value Date    BUN 14 05/11/2018     Lab Results   Component Value Date    WBC 10.02 05/11/2018      which calculates to an Estimated Creatinine Clearance: 84.6 mL/min (based on SCr of 1.3 mg/dL)..       Current weight is 117.9 kg (260 lb)  Initial load = 1500mg  The patient will be started on vancomycin at a dose of 1250 mg every 12 hours. Patient will be followed by pharmacy for changes in renal function, toxicity, and efficacy.  The vancomycin trough has been ordered for 5/13 at 08:45.      Thank you for allowing us to participate in this patient's care.     Alejandro Sam

## 2018-05-12 NOTE — H&P
[facility] Hospital Medicine  History & Physical    Patient Name: Milton Sol  MRN: [id number]  Admission Date: 5/11/2018  Attending Physician: Bull Hatfield MD  Primary Care Provider: Primary Doctor No         Patient information was obtained from patient and ER records.     Subjective:     Principal Problem:Cellulitis of left lower leg    Chief Complaint:   Chief Complaint   Patient presents with    Leg Pain     L knee to L foot.  Pt had abscess lanced at [facility] after hours 4 days ago and now L knee swollen/red. Multiple abrasions/rashes to LLE        HPI: 53M h/o HLD, HTN, and neuropathy presents with LLE infection.  He reports skin infection started 4 days ago when he noticed some redness, swelling and pain on his left kneecap.   He went to urgent care at [facility] ont he same day and was diagnosed with abscess s/p I/D.  He was prescribed clindamycin 300mg PO q6h, which patient reports he has been compliant on for the past 4 days.  Rash has extended to include his left shin.   Associated with subjective f/c.  No n/v, c/d, ab pain.  No other associated symptoms.  No other exacerbating or alleviating factors.   In Er, several excoriations are found on his left shin, even though patient denies scratching it.  VSS. WBC 10.. Patient was given vancomycin 1.5g bolus in ER.  Hospital medicine called for admission.     Past Medical History:   Diagnosis Date    High cholesterol     Hypertension        Past Surgical History:   Procedure Laterality Date    APPENDECTOMY      BACK SURGERY      FEMUR SURGERY      KNEE SURGERY         Review of patient's allergies indicates:  No Known Allergies    No current facility-administered medications on file prior to encounter.      Current Outpatient Prescriptions on File Prior to Encounter   Medication Sig    hydrocodone-acetaminophen 10-325mg (NORCO)  mg Tab Take 1 tablet by mouth every 4 (four) hours as needed for Pain.    ibuprofen (ADVIL,MOTRIN)  600 MG tablet Take 1 tablet (600 mg total) by mouth every 6 (six) hours as needed for Pain.     Family History     None        Social History Main Topics    Smoking status: Former Smoker     Types: Cigarettes    Smokeless tobacco: Never Used    Alcohol use No    Drug use: No    Sexual activity: Not on file     Review of Systems   Constitutional: Positive for chills (subjective) and fever (subjective). Negative for activity change, appetite change, diaphoresis and fatigue.   HENT: Negative for facial swelling, sore throat, tinnitus and trouble swallowing.    Eyes: Negative for photophobia and visual disturbance.   Respiratory: Negative for apnea, cough, chest tightness, shortness of breath and wheezing.    Cardiovascular: Negative for chest pain, palpitations and leg swelling.   Gastrointestinal: Negative for abdominal distention, abdominal pain, constipation, diarrhea, nausea and vomiting.   Endocrine: Negative for polydipsia, polyphagia and polyuria.   Genitourinary: Negative for decreased urine volume, dysuria, flank pain, frequency and hematuria.   Musculoskeletal: Negative for arthralgias, back pain, joint swelling, myalgias and neck stiffness.   Skin: Positive for rash (LLE). Negative for pallor.   Allergic/Immunologic: Negative for immunocompromised state.   Neurological: Negative for dizziness, seizures, syncope, weakness, numbness and headaches.   Psychiatric/Behavioral: Negative for confusion, hallucinations and suicidal ideas. The patient is not nervous/anxious.    All other systems reviewed and are negative.    Objective:     Vital Signs (Most Recent):  Temp: 99 °F (37.2 °C) (05/12/18 0011)  Pulse: 98 (05/12/18 0011)  Resp: 18 (05/12/18 0011)  BP: (!) 159/75 (05/12/18 0011)  SpO2: 97 % (05/12/18 0011) Vital Signs (24h Range):  Temp:  [98.8 °F (37.1 °C)-99 °F (37.2 °C)] 99 °F (37.2 °C)  Pulse:  [89-98] 98  Resp:  [18-20] 18  SpO2:  [97 %] 97 %  BP: (138-159)/(71-93) 159/75     Weight: 117.9 kg (260  lb)  Body mass index is 37.31 kg/m².    Physical Exam   Constitutional: He is oriented to person, place, and time. He appears well-developed and well-nourished. No distress.   HENT:   Head: Normocephalic and atraumatic.   Mouth/Throat: Oropharynx is clear and moist.   Eyes: Conjunctivae are normal. Pupils are equal, round, and reactive to light. No scleral icterus.   Neck: No JVD present. No thyromegaly present.   Cardiovascular: Regular rhythm.  Exam reveals no gallop and no friction rub.    No murmur heard.  Pulmonary/Chest: Effort normal and breath sounds normal. No respiratory distress. He has no wheezes. He has no rales.   Abdominal: Soft. Bowel sounds are normal. He exhibits no distension. There is no tenderness. There is no guarding.   Musculoskeletal: Normal range of motion.   Neurological: He is alert and oriented to person, place, and time. No cranial nerve deficit.   Skin: Skin is warm. Capillary refill takes 2 to 3 seconds. Rash (erythema of left knee cap and shin.  Several excoriations of left shin is present.  No joint swelling of left knee present. ) noted. He is not diaphoretic. No erythema.   Psychiatric: He has a normal mood and affect.   Nursing note and vitals reviewed.        CRANIAL NERVES     CN III, IV, VI   Pupils are equal, round, and reactive to light.       Significant Labs:   BMP:   Recent Labs  Lab 05/11/18 2122   *      K 4.5      CO2 20*   BUN 14   CREATININE 1.3   CALCIUM 9.3     CBC:   Recent Labs  Lab 05/11/18 2122   WBC 10.02   HGB 13.9*   HCT 39.8*        All pertinent labs within the past 24 hours have been reviewed.    Significant Imaging: I have reviewed all pertinent imaging results/findings within the past 24 hours.     Assessment/Plan:     * Cellulitis of left lower leg    - complicated, patients reports worsened even with PO clindamycin  - nonpurulent    - check XR to see if involving knee joint  - order ESR, CRP  - continue vancomycin with  pharm to dose            Muscle spasms of lower extremity    - continue tizandine at home dose          Essential hypertension    - controlled  - continue metoprolol and lisinopril at home dose          Neuropathy    - continue amitryptyline and gabapentin at home dose          Hyperlipidemia    - continue atorvastatin at home dose            VTE Risk Mitigation         Ordered     Place sequential compression device  Until discontinued      05/12/18 0142     IP VTE LOW RISK PATIENT  Once      05/11/18 2302             Bull Hatfield MD  Department of Hospital Medicine   [facility]

## 2018-05-12 NOTE — ED PROVIDER NOTES
SCRIBE #1 NOTE: ITammi, am scribing for, and in the presence of, WM Easley . I have scribed the HPI and ROS.     SCRIBE #2 NOTE: IGaviota, am scribing for, and in the presence of,  WM Easley. I have scribed the remaining portions of the note not scribed by Scribe #1.     History      Chief Complaint   Patient presents with    Leg Pain     L knee to L foot.  Pt had abscess lanced at [facility] after hours 4 days ago and now L knee swollen/red. Multiple abrasions/rashes to LLE       Review of patient's allergies indicates:  No Known Allergies     HPI   HPI    5/11/2018, 8:53 PM   History obtained from the patient      History of Present Illness: Milton Sol is a 53 y.o. male patient who presents to the Emergency Department for left leg/knee pain, redness, and swelling which onset gradually 4 days ago. Symptoms are constant in course  and moderate in degree. Pt was seen at [facility] 4 days ago for an abscess, had I&D , and was prescribed Clindamycin 300 ng PO q 6 hrs. Pt reports symptoms are progressively worsening despite being compliant with medication with redness, pain, and swelling extending above knee and down shin. Subjective fever/chills last night. No mitigating or exacerbating factors reported. Patient denies any N/V , DROM , numbness, and all other sxs at this time. No further complaints or concerns at this time.         Arrival mode:Personal vehicle    PCP: Primary Doctor No       Past Medical History:  Past Medical History:   Diagnosis Date    High cholesterol     Hypertension        Past Surgical History:  Past Surgical History:   Procedure Laterality Date    APPENDECTOMY      BACK SURGERY      FEMUR SURGERY      KNEE SURGERY           Family History:  No family history on file.    Social History:  Social History     Social History Main Topics    Smoking status: Never Smoker    Smokeless tobacco: Never Used    Alcohol use No    Drug use: No     "Sexual activity: Not on file       ROS   Review of Systems   Constitutional: Negative for chills and fever.   HENT: Negative for congestion and rhinorrhea.    Respiratory: Negative for shortness of breath.    Cardiovascular: Negative for chest pain.   Gastrointestinal: Negative for nausea and vomiting.   Genitourinary: Negative for dysuria and hematuria.   Musculoskeletal: Positive for joint swelling (left knee) and myalgias (left leg).   Skin:        (+) redness to LLE   Neurological: Negative for dizziness and headaches.       Physical Exam      Initial Vitals [05/11/18 2021]   BP Pulse Resp Temp SpO2   (!) 150/93 89 20 98.8 °F (37.1 °C) 97 %      MAP       112          Physical Exam  Nursing Notes and Vital Signs Reviewed.  Constitutional: Appears to be uncomfortable. Antalgic gait.   Head: Normocephalic.  Eyes: PERRL. EOM intact.   Neck: Supple.  Cardiovascular: Regular rate. Regular rhythm  Pulmonary/Chest: No respiratory distress.   Abdominal: Soft and non-distended. There is no tenderness.    Musculoskeletal: Moves all extremities. No septic joint.   Skin: Warm and dry. Abscess/cellulitis/edema to left knee and left lower leg - see image. No calf pain.   Neurological:  Alert, awake, and appropriate. AAOx3. 5/5 strength extremities x4.  Normal speech.  Psychiatric: Normal affect. Appropriate in content.        ED Course    Procedures  ED Vital Signs:  Vitals:    05/11/18 2021   BP: (!) 150/93   Pulse: 89   Resp: 20   Temp: 98.8 °F (37.1 °C)   TempSrc: Oral   SpO2: 97%   Weight: 117.9 kg (260 lb)   Height: 5' 10" (1.778 m)       Abnormal Lab Results:  Labs Reviewed   CBC W/ AUTO DIFFERENTIAL - Abnormal; Notable for the following:        Result Value    RBC 4.44 (*)     Hemoglobin 13.9 (*)     Hematocrit 39.8 (*)     MCH 31.3 (*)     All other components within normal limits   BASIC METABOLIC PANEL - Abnormal; Notable for the following:     CO2 20 (*)     Glucose 122 (*)     All other components within normal " limits   SEDIMENTATION RATE, MANUAL - Abnormal; Notable for the following:     Sed Rate 36 (*)     All other components within normal limits   CULTURE, BLOOD   CULTURE, BLOOD   LACTIC ACID, PLASMA   C-REACTIVE PROTEIN        All Lab Results:  Results for orders placed or performed during the hospital encounter of 05/11/18   CBC auto differential   Result Value Ref Range    WBC 10.02 3.90 - 12.70 K/uL    RBC 4.44 (L) 4.60 - 6.20 M/uL    Hemoglobin 13.9 (L) 14.0 - 18.0 g/dL    Hematocrit 39.8 (L) 40.0 - 54.0 %    MCV 90 82 - 98 fL    MCH 31.3 (H) 27.0 - 31.0 pg    MCHC 34.9 32.0 - 36.0 g/dL    RDW 13.1 11.5 - 14.5 %    Platelets 239 150 - 350 K/uL    MPV 9.6 9.2 - 12.9 fL    Gran # (ANC) 6.1 1.8 - 7.7 K/uL    Lymph # 2.8 1.0 - 4.8 K/uL    Mono # 0.6 0.3 - 1.0 K/uL    Eos # 0.5 0.0 - 0.5 K/uL    Baso # 0.04 0.00 - 0.20 K/uL    Gran% 61.0 38.0 - 73.0 %    Lymph% 27.8 18.0 - 48.0 %    Mono% 6.1 4.0 - 15.0 %    Eosinophil% 4.7 0.0 - 8.0 %    Basophil% 0.4 0.0 - 1.9 %    Differential Method Automated    Basic metabolic panel   Result Value Ref Range    Sodium 140 136 - 145 mmol/L    Potassium 4.5 3.5 - 5.1 mmol/L    Chloride 109 95 - 110 mmol/L    CO2 20 (L) 23 - 29 mmol/L    Glucose 122 (H) 70 - 110 mg/dL    BUN, Bld 14 6 - 20 mg/dL    Creatinine 1.3 0.5 - 1.4 mg/dL    Calcium 9.3 8.7 - 10.5 mg/dL    Anion Gap 11 8 - 16 mmol/L    eGFR if African American >60 >60 mL/min/1.73 m^2    eGFR if non African American >60 >60 mL/min/1.73 m^2   Lactic acid, plasma   Result Value Ref Range    Lactate (Lactic Acid) 1.7 0.5 - 2.2 mmol/L   Sedimentation rate, manual   Result Value Ref Range    Sed Rate 36 (H) 0 - 10 mm/Hr   C-reactive protein   Result Value Ref Range    CRP 6.0 0.0 - 8.2 mg/L         Imaging Results:  Imaging Results    None                 The Emergency Provider reviewed the vital signs and test results, which are outlined above.    ED Discussion     10:58 PM: Discussed case with Hilda ([facility]). Dr. Hatfield  agrees with current care and management of pt and accepts admission.   Admitting Service: Hospital medicine   Admitting Physician: Liu  Admit to: [facility]    11:00 PM: Re-evaluated pt. I have discussed test results, shared treatment plan, and the need for admission with patient and family at bedside. Pt and family express understanding at this time and agree with all information. All questions answered. Pt and family have no further questions or concerns at this time. Pt is ready for admit.        ED Medication(s):  Medications   vancomycin (VANCOCIN) 1,500 mg in sodium chloride 0.9% 250 mL IVPB (1,500 mg Intravenous New Bag 5/11/18 0040)   morphine injection 4 mg (not administered)   ondansetron disintegrating tablet 8 mg (not administered)       New Prescriptions    No medications on file             Medical Decision Making    Medical Decision Making:   Initial Assessment:   Abscess to left knee 4 days s/p I & D at outside facility, on Clindamycin and getting worse.   Clinical Tests:   Lab Tests: Ordered and Reviewed  ED Management:  Failed outpatient therapy              Scribe Attestation:   Scribe #1: I performed the above scribed service and the documentation accurately describes the services I performed. I attest to the accuracy of the note.    Attending:   Physician Attestation Statement for Scribe #1: I, WM Easley , personally performed the services described in this documentation, as scribed by Tammi Brown, in my presence, and it is both accurate and complete.       Scribe Attestation:   Scribe #2: I performed the above scribed service and the documentation accurately describes the services I performed. I attest to the accuracy of the note.    Attending Attestation:           Physician Attestation for Scribe:    Physician Attestation Statement for Scribe #2: I, WM Easley, reviewed documentation, as scribed by Gaviota Knapp in my presence, and it is both accurate and complete. I also  acknowledge and confirm the content of the note done by Scribe #1.          Clinical Impression       ICD-10-CM ICD-9-CM   1. Cellulitis of left lower leg L03.116 682.6       Disposition:   Disposition: Admitted  Condition: Evelia Whitten PA-C  05/11/18 9848

## 2018-05-12 NOTE — PLAN OF CARE
Problem: Patient Care Overview  Goal: Plan of Care Review  Outcome: Ongoing (interventions implemented as appropriate)  Reviewed POC, including indications and possible side effects of administered medications. Pt verbalized understanding and teach back. Spoke with spouse on phone and reviewed POC with her as well. Went over home medications with spouse as pt does not know what he takes. Redness to LLE noted and marked in ED. C/o chronic back pain. Pt quickly fell asleep before any PRN meds were ordered and is resting comfortably, easy to arouse. Oriented to room features, including call bell, spectra link phone, and hourly rounding. Pt verbalized understanding. Remains free from injury. Ambulated to BR independently upon arrival from ED.    12 hour chart check complete.

## 2018-05-13 NOTE — PLAN OF CARE
05/13/18 1556   Final Note   Assessment Type Final Discharge Note   Discharge Disposition Home   What phone number can be called within the next 1-3 days to see how you are doing after discharge? [phone]   Right Care Referral Info   Post Acute Recommendation No Care

## 2018-05-15 ENCOUNTER — PATIENT OUTREACH (OUTPATIENT)
Dept: ADMINISTRATIVE | Facility: CLINIC | Age: 53
End: 2018-05-15

## 2018-05-15 NOTE — PATIENT INSTRUCTIONS
Discharge Instructions for Cellulitis  You have been diagnosed with cellulitis. This is an infection in the deepest layer of the skin. In some cases, the infection also affects the muscle. Cellulitis is caused by bacteria. The bacteria can enter the body through broken skin. This can happen with a cut, scratch, animal bite, or an insect bite that has been scratched. You may have been treated in the hospital with antibiotics and fluids. You will likely be given a prescription for antibiotics to take at home. This sheet will help you take care of yourself at home.  Home care  When you are home:  · Take the prescribed antibiotic medicine you are given as directed until it is gone. Take it even if you feel better. It treats the infection and stops it from returning. Not taking all the medicine can make future infections hard to treat.  · Keep the infected area clean.  · When possible, raise the infected area above the level of your heart. This helps keep swelling down.  · Talk with your healthcare provider if you are in pain. Ask what kind of over-the-counter medicine you can take for pain.  · Apply clean bandages as advised.  · Take your temperature once a day for a week.  · Wash your hands often to prevent spreading the infection.  In the future, wash your hands before and after you touch cuts, scratches, or bandages. This will help prevent infection.   When to call your healthcare provider  Call your healthcare provider immediately if you have any of the following:  · Difficulty or pain when moving the joints above or below the infected area  · Discharge or pus draining from the area  · Fever of 100.4°F (38°C) or higher, or as directed by your healthcare provider  · Pain that gets worse in or around the infected   · Redness that gets worse in or around the infected area, particularly if the area of redness expands to a wider area  · Shaking chills  · Swelling of the infected area  · Vomiting   Date Last Reviewed:  8/1/2016  © 8701-9520 [employer]. [address] [place] [address], [place] [address]. All rights reserved. This information is not intended as a substitute for professional medical care. Always follow your healthcare professional's instructions.

## 2018-05-17 LAB
BACTERIA BLD CULT: NORMAL
BACTERIA BLD CULT: NORMAL

## 2019-02-06 ENCOUNTER — HOSPITAL ENCOUNTER (EMERGENCY)
Facility: HOSPITAL | Age: 54
Discharge: ANOTHER HOSPITAL | End: 2019-02-06
Attending: EMERGENCY MEDICINE
Payer: COMMERCIAL

## 2019-02-06 VITALS
DIASTOLIC BLOOD PRESSURE: 68 MMHG | WEIGHT: 287.06 LBS | OXYGEN SATURATION: 94 % | TEMPERATURE: 98 F | RESPIRATION RATE: 13 BRPM | BODY MASS INDEX: 41.19 KG/M2 | SYSTOLIC BLOOD PRESSURE: 118 MMHG | HEART RATE: 92 BPM

## 2019-02-06 DIAGNOSIS — M79.669 CALF PAIN: ICD-10-CM

## 2019-02-06 DIAGNOSIS — R07.9 CHEST PAIN, UNSPECIFIED TYPE: ICD-10-CM

## 2019-02-06 DIAGNOSIS — I60.9 SUBARACHNOID BLEED: ICD-10-CM

## 2019-02-06 DIAGNOSIS — I10 HYPERTENSION, UNSPECIFIED TYPE: ICD-10-CM

## 2019-02-06 DIAGNOSIS — I63.9 LEFT-SIDED CEREBROVASCULAR ACCIDENT (CVA): Primary | ICD-10-CM

## 2019-02-06 DIAGNOSIS — R56.9 SEIZURE: ICD-10-CM

## 2019-02-06 DIAGNOSIS — R53.1 RIGHT SIDED WEAKNESS: ICD-10-CM

## 2019-02-06 PROBLEM — I63.512 ACUTE ISCHEMIC LEFT MCA STROKE: Status: ACTIVE | Noted: 2019-02-06

## 2019-02-06 PROBLEM — F44.7 FUNCTIONAL NEUROLOGICAL SYMPTOM DISORDER WITH MIXED SYMPTOMS: Status: ACTIVE | Noted: 2019-02-06

## 2019-02-06 LAB
ALBUMIN SERPL BCP-MCNC: 4.2 G/DL
ALP SERPL-CCNC: 101 U/L
ALT SERPL W/O P-5'-P-CCNC: 40 U/L
ANION GAP SERPL CALC-SCNC: 12 MMOL/L
APTT BLDCRRT: 23.1 SEC
AST SERPL-CCNC: 27 U/L
BASOPHILS # BLD AUTO: 0.1 K/UL
BASOPHILS NFR BLD: 1.1 %
BILIRUB SERPL-MCNC: 0.6 MG/DL
BNP SERPL-MCNC: <10 PG/ML
BUN SERPL-MCNC: 15 MG/DL
CALCIUM SERPL-MCNC: 10.1 MG/DL
CHLORIDE SERPL-SCNC: 108 MMOL/L
CO2 SERPL-SCNC: 20 MMOL/L
CREAT SERPL-MCNC: 1.5 MG/DL
DIFFERENTIAL METHOD: NORMAL
EOSINOPHIL # BLD AUTO: 0.4 K/UL
EOSINOPHIL NFR BLD: 3.9 %
ERYTHROCYTE [DISTWIDTH] IN BLOOD BY AUTOMATED COUNT: 12.3 %
EST. GFR  (AFRICAN AMERICAN): >60 ML/MIN/1.73 M^2
EST. GFR  (NON AFRICAN AMERICAN): 52.4 ML/MIN/1.73 M^2
GLUCOSE SERPL-MCNC: 82 MG/DL
HCT VFR BLD AUTO: 46 %
HGB BLD-MCNC: 15.8 G/DL
INR PPP: 0.9
LYMPHOCYTES # BLD AUTO: 2.9 K/UL
LYMPHOCYTES NFR BLD: 32.7 %
MCH RBC QN AUTO: 30.9 PG
MCHC RBC AUTO-ENTMCNC: 34.3 G/DL
MCV RBC AUTO: 90 FL
MONOCYTES # BLD AUTO: 0.6 K/UL
MONOCYTES NFR BLD: 7.1 %
NEUTROPHILS # BLD AUTO: 4.9 K/UL
NEUTROPHILS NFR BLD: 54.9 %
PLATELET # BLD AUTO: 260 K/UL
PMV BLD AUTO: 10.3 FL
POCT GLUCOSE: 82 MG/DL (ref 70–110)
POTASSIUM SERPL-SCNC: 4.5 MMOL/L
PROT SERPL-MCNC: 7.3 G/DL
PROTHROMBIN TIME: 9.8 SEC
RBC # BLD AUTO: 5.12 M/UL
SODIUM SERPL-SCNC: 140 MMOL/L
TROPONIN I SERPL DL<=0.01 NG/ML-MCNC: <0.006 NG/ML
TSH SERPL DL<=0.005 MIU/L-ACNC: 1.99 UIU/ML
WBC # BLD AUTO: 8.96 K/UL

## 2019-02-06 PROCEDURE — 63600175 PHARM REV CODE 636 W HCPCS: Mod: ER | Performed by: EMERGENCY MEDICINE

## 2019-02-06 PROCEDURE — 99244 PR OFFICE CONSULTATION,LEVEL IV: ICD-10-PCS | Mod: GT,,, | Performed by: PSYCHIATRY & NEUROLOGY

## 2019-02-06 PROCEDURE — 80061 LIPID PANEL: CPT

## 2019-02-06 PROCEDURE — 84484 ASSAY OF TROPONIN QUANT: CPT | Mod: ER

## 2019-02-06 PROCEDURE — 85610 PROTHROMBIN TIME: CPT | Mod: ER

## 2019-02-06 PROCEDURE — 83880 ASSAY OF NATRIURETIC PEPTIDE: CPT | Mod: ER

## 2019-02-06 PROCEDURE — 99244 OFF/OP CNSLTJ NEW/EST MOD 40: CPT | Mod: GT,,, | Performed by: PSYCHIATRY & NEUROLOGY

## 2019-02-06 PROCEDURE — 96365 THER/PROPH/DIAG IV INF INIT: CPT | Mod: ER

## 2019-02-06 PROCEDURE — 96361 HYDRATE IV INFUSION ADD-ON: CPT | Mod: ER

## 2019-02-06 PROCEDURE — 25000003 PHARM REV CODE 250: Mod: ER | Performed by: EMERGENCY MEDICINE

## 2019-02-06 PROCEDURE — 99243 PR OFFICE CONSULTATION,LEVEL III: ICD-10-PCS | Mod: GT,G0,, | Performed by: PSYCHIATRY & NEUROLOGY

## 2019-02-06 PROCEDURE — 84443 ASSAY THYROID STIM HORMONE: CPT | Mod: ER

## 2019-02-06 PROCEDURE — 85025 COMPLETE CBC W/AUTO DIFF WBC: CPT | Mod: ER

## 2019-02-06 PROCEDURE — 93010 ELECTROCARDIOGRAM REPORT: CPT | Mod: ,,, | Performed by: INTERNAL MEDICINE

## 2019-02-06 PROCEDURE — 85730 THROMBOPLASTIN TIME PARTIAL: CPT | Mod: ER

## 2019-02-06 PROCEDURE — 96375 TX/PRO/DX INJ NEW DRUG ADDON: CPT | Mod: ER

## 2019-02-06 PROCEDURE — 99285 EMERGENCY DEPT VISIT HI MDM: CPT | Mod: 25,ER

## 2019-02-06 PROCEDURE — 99243 OFF/OP CNSLTJ NEW/EST LOW 30: CPT | Mod: GT,G0,, | Performed by: PSYCHIATRY & NEUROLOGY

## 2019-02-06 PROCEDURE — 80053 COMPREHEN METABOLIC PANEL: CPT | Mod: ER

## 2019-02-06 PROCEDURE — 93010 EKG 12-LEAD: ICD-10-PCS | Mod: ,,, | Performed by: INTERNAL MEDICINE

## 2019-02-06 RX ORDER — METOPROLOL SUCCINATE 50 MG/1
50 TABLET, EXTENDED RELEASE ORAL
COMMUNITY
Start: 2018-07-26

## 2019-02-06 RX ORDER — LISINOPRIL 20 MG/1
20 TABLET ORAL
COMMUNITY
Start: 2018-07-26

## 2019-02-06 RX ORDER — LORAZEPAM 2 MG/ML
INJECTION INTRAMUSCULAR
Status: DISCONTINUED
Start: 2019-02-06 | End: 2019-02-07 | Stop reason: HOSPADM

## 2019-02-06 RX ORDER — MELOXICAM 7.5 MG/1
7.5 TABLET ORAL
COMMUNITY
Start: 2018-05-12

## 2019-02-06 RX ORDER — ATORVASTATIN CALCIUM 40 MG/1
40 TABLET, FILM COATED ORAL
COMMUNITY
Start: 2018-07-26

## 2019-02-06 RX ADMIN — LORAZEPAM 2 MG: 2 INJECTION INTRAMUSCULAR; INTRAVENOUS at 08:02

## 2019-02-06 RX ADMIN — SODIUM CHLORIDE 1000 ML: 0.9 INJECTION, SOLUTION INTRAVENOUS at 04:02

## 2019-02-06 RX ADMIN — ALTEPLASE 81 MG: KIT at 06:02

## 2019-02-06 NOTE — CONSULTS
[facility]  Vascular Neurology  Comprehensive Stroke Center  Tele-Consultation Note      Inpatient consult to Telemedicine-Stroke  Consult performed by: Levar Aleman MD  Consult ordered by: Lisbet Moore DO          Consulting Provider: Spoke Physician:: lisebt moore  Current Providers  No providers found    Patient Location: [facility]  Spoke hospital nurse at bedside with patient assisting consultant.     Patient information was obtained from patient.       Assessment/Plan:     STROKE DOCUMENTATION     Acute Stroke Times:   Acute Stroke Times   Stroke Team Called Time: 1559  Stroke Team Arrival Time: 1559  CT Interpretation Time: 1559    NIH Scale:  1a. Level of Consciousness: 0-->Alert, keenly responsive  1b. LOC Questions: 0-->Answers both questions correctly  1c. LOC Commands: 0-->Performs both tasks correctly  2. Best Gaze: 0-->Normal  3. Visual: 0-->No visual loss  4. Facial Palsy: 0-->Normal symmetrical movements  5a. Motor Arm, Left: 0-->No drift, limb holds 90 (or 45) degrees for full 10 secs  5b. Motor Arm, Right: 3-->No effort against gravity, limb falls(functional)  6a. Motor Leg, Left: 0-->No drift, leg holds 30 degree position for full 5 secs  6b. Motor Leg, Right: 3-->No effort against gravity, leg falls to bed immediately(functional)  7. Limb Ataxia: 0-->Absent  8. Sensory: 0-->Normal, no sensory loss  9. Best Language: 0-->No aphasia, normal  10. Dysarthria: 0-->Normal  11. Extinction and Inattention (formerly Neglect): 0-->No abnormality  Total (NIH Stroke Scale): 6     Modified Jayne    [place] Coma Scale:    ABCD2 Score:    [id number] Score:   HAS -BLED Score:   ICH Score:   Hunt & Morrow Classification:       Diagnoses:   Functional neurological symptom disorder with mixed symptoms    Overall presentation and current examination is highly suspect for functional neurological disorder. He has + arm drop test and other  elements of a dynamic exam which suggest not true weakness. Severe chest pain at onset is also atypical. Given all of these, would recommend stat MRI to r/o stroke definitively within the time window for thrombolytics but would not give tpa at this point. OK to load asa 325 while workup continues.  Plan discussed w/ ED staff. If infarct, and within time window, will administer thrombolytics and triage appropraitely.         Blood pressure 115/69, pulse 87, temperature 97.8 °F (36.6 °C), temperature source Oral, resp. rate 20, weight 130.2 kg (287 lb 0.6 oz), SpO2 100 %.  Alteplase Eligible?: Yes  Alteplase Recommendation: Alteplase not recommended due to Suspected stroke mimic   Possible Interventional Revascularization Candidate? No; No large vessel occlusion    Disposition Recommendation: pending further studies      Subjective:     History of Present Illness:  53M w/ severe chest pain as he reached back to grab a hat while driving, chest pain lasted for several minutes, and then started to have right sided paralysis and inability to speak. He is improving since this started but still having right sided weakness. The aforementioned symptoms have never happened before. There are no identified triggers or modifying factors. There have been no recurrent events. There are no other associated symptoms.        Woke up with symptoms?: yes  Last known normal:    1 pm    Recent bleeding noted: no  Does the patient take any Blood Thinners? no  Medications: Antiplatelets:  aspirin      Past Medical History: HTN, HLD, pain mgmt for back    Past Surgical History: no major surgeries within the last 2 weeks    Family History: no relevant history    Social History: no smoking, no drinking, no drugs    Allergies: No Known Allergies No relevant allergies    Review of Systems   Constitutional: Negative for appetite change, chills and fever.   HENT: Negative for congestion and sore throat.    Eyes: Negative for discharge and itching.    Respiratory: Negative for apnea and shortness of breath.    Cardiovascular: Negative for chest pain and palpitations.   Gastrointestinal: Negative for abdominal pain and anal bleeding.   Endocrine: Negative for cold intolerance and polydipsia.   Genitourinary: Negative for dysuria and hematuria.   Musculoskeletal: Negative for joint swelling and myalgias.   Skin: Negative for color change and rash.   Neurological: Negative for tremors.   Psychiatric/Behavioral: Negative for hallucinations and self-injury.     Objective:   Vitals:  115/69 mmHg, pulse 97, 100% ra, rr 12    CT READ: Yes  No hemmorhage. No mass effect. No early infarct signs.     Physical Exam   Constitutional: He appears well-nourished. No distress.   HENT:   Head: Atraumatic.   Right Ear: External ear normal.   Left Ear: External ear normal.   Eyes: Conjunctivae are normal. No scleral icterus.   Neck: Normal range of motion.   Pulmonary/Chest: Effort normal.   Abdominal: He exhibits no distension. There is no guarding.   Musculoskeletal: Normal range of motion. He exhibits no deformity.   Neurological: He is alert.   Skin: Skin is warm and dry.   Psychiatric: He has a normal mood and affect.             Recommended the emergency room physician to have a brief discussion with the patient and/or family if available regarding the risks and benefits of treatment, and to briefly document the occurrence of that discussion in his clinical encounter note.     The attending portion of this evaluation, treatment, and documentation was performed per Levar Aleman MD via audiovisual.    Billing code:  (non-intervention mild to moderate stroke, TIA, some mimics)    · This patient has a critical neurological condition/illness, with some potential for high morbidity and mortality.  · There is a moderate probability for acute neurological change leading to clinical and possibly life-threatening deterioration requiring highest level of physician preparedness  for urgent intervention.  · Care was coordinated with other physicians involved in the patient's care.  · Radiologic studies and laboratory data were reviewed and interpreted, and plan of care was re-assessed based on the results.  · Diagnosis, treatment options and prognosis may have been discussed with the patient and/or family members or caregiver.      In your opinion, this was a: Tier 1    Consult End Time: 4:13 PM     Levar Aleman MD  [facility] Stroke [facility]  Vascular Neurology   [facility]

## 2019-02-06 NOTE — HPI
53M w/ severe chest pain as he reached back to grab a hat while driving, chest pain lasted for several minutes, and then started to have right sided paralysis and inability to speak. He is improving since this started but still having right sided weakness. The aforementioned symptoms have never happened before. There are no identified triggers or modifying factors. There have been no recurrent events. There are no other associated symptoms.    Follow up post MRI

## 2019-02-06 NOTE — SUBJECTIVE & OBJECTIVE
Woke up with symptoms?: yes  Last known normal:    1 pm    Recent bleeding noted: no  Does the patient take any Blood Thinners? no  Medications: Antiplatelets:  aspirin      Past Medical History: HTN, HLD, pain mgmt for back    Past Surgical History: no major surgeries within the last 2 weeks    Family History: no relevant history    Social History: no smoking, no drinking, no drugs    Allergies: No Known Allergies No relevant allergies    Review of Systems   Constitutional: Negative for appetite change, chills and fever.   HENT: Negative for congestion and sore throat.    Eyes: Negative for discharge and itching.   Respiratory: Negative for apnea and shortness of breath.    Cardiovascular: Negative for chest pain and palpitations.   Gastrointestinal: Negative for abdominal pain and anal bleeding.   Endocrine: Negative for cold intolerance and polydipsia.   Genitourinary: Negative for dysuria and hematuria.   Musculoskeletal: Negative for joint swelling and myalgias.   Skin: Negative for color change and rash.   Neurological: Negative for tremors.   Psychiatric/Behavioral: Negative for hallucinations and self-injury.     Objective:   Vitals:  115/69 mmHg, pulse 97, 100% ra, rr 12    CT READ: Yes  No hemmorhage. No mass effect. No early infarct signs.     Physical Exam   Constitutional: He appears well-nourished. No distress.   HENT:   Head: Atraumatic.   Right Ear: External ear normal.   Left Ear: External ear normal.   Eyes: Conjunctivae are normal. No scleral icterus.   Neck: Normal range of motion.   Pulmonary/Chest: Effort normal.   Abdominal: He exhibits no distension. There is no guarding.   Musculoskeletal: Normal range of motion. He exhibits no deformity.   Neurological: He is alert.   Skin: Skin is warm and dry.   Psychiatric: He has a normal mood and affect.

## 2019-02-06 NOTE — ASSESSMENT & PLAN NOTE
Overall presentation and current examination is highly suspect for functional neurological disorder. He has + arm drop test and other elements of a dynamic exam which suggest not true weakness. Severe chest pain at onset is also atypical. Given all of these, would recommend stat MRI to r/o stroke definitively within the time window for thrombolytics but would not give tpa at this point. OK to load asa 325 while workup continues.  Plan discussed w/ ED staff. If infarct, and within time window, will administer thrombolytics and triage appropraitely.

## 2019-02-06 NOTE — ED NOTES
Patient to CT via [facility] stretcher at this time. Accompanied by Dr. Daley, 2 RN, and [facility] paramedic.

## 2019-02-06 NOTE — ED PROVIDER NOTES
"   History     Chief Complaint   Patient presents with    Aphasia     Per [facility], came from [facility]. R sided weakness, and slurred speech, onset approx 45 min ago.        Review of patient's allergies indicates:  No Known Allergies    History of Present Illness   HPI    2/6/2019, 3:47 PM  The history is provided by the patient and [facility]    Milton Fidel Sol is a 53 y.o. male presenting to the ED for right arm weakness and right leg weakness.    Onset:  15:00.  Patient was at work.  Patient had gone to reach between the seats to grab his hard hat.  He states that he had "twiching" occurred.  It was located the right side of the body.  There is no loss of consciousness, no bowel or bladder incontinence.  The chest pain located in the central chest.  Patient had blurred his horn to get help.  He was asking them to give them his nitroglycerin.  Patient was given 2s sublingual nitroglycerines for his chest pain. The pain was described as a pressure.  It was not ripping or tearing in nature.  It was associated with shortness of breath and diaphoresis.  He states that the symptoms lasted 5-7 minutes.  Afterwards, patient felt like he could move his right upper extremity or his right lower extremity.  [facility] was notified.    Blood sugar was 105.  Patient has had prior history of DVT-been several years since being on anticoagulant  Patient took 81 mg of Aspirin this morning.     Arrival mode:  AAS    PCP: Primary Doctor No     Allergies:  Review of patient's allergies indicates:  No Known Allergies    Past Medical History:  Past Medical History:   Diagnosis Date    High cholesterol     Hypertension        Past Surgical History:  Past Surgical History:   Procedure Laterality Date    APPENDECTOMY      BACK SURGERY      FEMUR SURGERY      KNEE SURGERY           Family History:  History reviewed. No pertinent family history.    Social History:  Social History     Tobacco Use    Smoking status: Former Smoker     Types: " Cigarettes    Smokeless tobacco: Never Used   Substance and Sexual Activity    Alcohol use: No    Drug use: No    Sexual activity: Not on file        Review of Systems   Review of Systems   Constitutional: Negative for fever.   HENT: Negative for sore throat.    Respiratory: Positive for chest tightness and shortness of breath.    Cardiovascular: Negative for chest pain.   Gastrointestinal: Negative for nausea and vomiting.   Genitourinary: Negative for dysuria.   Musculoskeletal: Negative for back pain.   Skin: Negative for rash.   Neurological: Positive for speech difficulty, numbness and headaches. Negative for facial asymmetry and weakness.        Weakness right upper and lower extremity.    Hematological: Does not bruise/bleed easily.   Psychiatric/Behavioral: The patient is not nervous/anxious.         Physical Exam     Initial Vitals [19 1559]   BP Pulse Resp Temp SpO2   115/69 87 20 97.8 °F (36.6 °C) 100 %      MAP       --          Physical Exam   Neurological:   NIH Stroke Scale    1a  Level of consciousness: 0=alert; keenly responsive  1b. LOC questions:  0=Answers both tasks correctly  1c. LOC commands: 0=Answers both tasks correctly  2.  Best Gaze: 0=normal  3.  Visual: 0=No visual loss  4. Facial Palsy: 0=Normal symmetric movement  5a.  Motor left arm: 0=No drift, limb holds 90 (or 45) degrees for full 10 seconds  5b.  Motor right arm: 3=No effort against gravity, limb falls  6a. motor left le=No drift, limb holds 90 (or 45) degrees for full 10 seconds  6b  Motor right le=Some effort against gravity, limb cannot get to or maintain (if cured) 90 (or 45) degrees, drifts down to bed, but has some effort against gravity  7. Limb Ataxia: 1=Present in one limb  8.  Sensory: 0=Normal; no sensory loss  9. Best Language:  0=No aphasia, normal  10. Dysarthria: 0=Normal  11. Extinction and Inattention: 0=No abnormality  12. Distal motor function: 0=At least some extension after 5 seconds, but  is not fully extended. Any movement of the fingers which is not in response to a command is not scored   Total:   6       Nursing Notes and Vital Signs Reviewed.  Constitutional: Patient is in mild distress. Well-developed and well-nourished.  Head: Atraumatic. Normocephalic.  Eyes: PERRL. EOM intact. Conjunctivae are not pale. No scleral icterus.  ENT: Mucous membranes are moist. Oropharynx is clear and symmetric.    Neck: Supple. Full ROM. No lymphadenopathy.  Cardiovascular: Regular rate. Regular rhythm. No murmurs, rubs, or gallops. Distal pulses are 2+ and symmetric.  Pulmonary/Chest: No respiratory distress. Clear to auscultation bilaterally. No wheezing or rales.  Abdominal: Soft and non-distended.  There is no tenderness.  No rebound, guarding, or rigidity. Good bowel sounds.  Genitourinary: No CVA tenderness  Musculoskeletal: Moves all extremities. No obvious deformities. No edema. No calf tenderness.  Skin: Warm and dry.  Neurological:  Alert, awake, and appropriate.  Normal speech.  No acute focal neurological deficits are appreciated. GCS 15.  There is no facial droop that is noted. Patient appears to have weakness of his right upper extremity and right lower extremity.  He is unable to lift them up against gravity.  When patient is lying flat and hand is held above head, the hand falls and misses the head.  Patient is able to bend his right leg and dry his right ankle up as well. Sensation is intact.  Psychiatric: Normal affect. Good eye contact. Appropriate in content.     ED Course   Critical Care  Date/Time: 2/6/2019 8:14 PM  Performed by: Jacques Lucero MD  Authorized by: Jacques Lucero MD   Direct patient critical care time: 40 minutes  Ordering / reviewing critical care time: 10 minutes  Documentation critical care time: 16 minutes  Consulting other physicians critical care time: 15 minutes  Total critical care time (exclusive of procedural time) : 81 minutes  Critical care time was  exclusive of separately billable procedures and treating other patients.  Critical care was time spent personally by me on the following activities: development of treatment plan with patient or surrogate, discussions with consultants, evaluation of patient's response to treatment, examination of patient, ordering and performing treatments and interventions, obtaining history from patient or surrogate, ordering and review of laboratory studies, ordering and review of radiographic studies, pulse oximetry and re-evaluation of patient's condition.  Subsequent provider of critical care: I assumed direction of critical care for this patient from another provider of my specialty.        ED Vital Signs:  Vitals:    02/06/19 1830 02/06/19 1850 02/06/19 1900 02/06/19 1915   BP: (!) 161/74 126/72 139/73 (!) 146/81   Pulse: 86 84 85 84   Resp: 20 16 20 (!) 21   Temp:       TempSrc:       SpO2:  98% 97% 98%   Weight:        02/06/19 1930 02/06/19 1945 02/06/19 1949 02/06/19 2000   BP: (!) 161/82 (!) 165/80 (!) 152/76 (!) 147/75   Pulse: 86 87 89 85   Resp: 20 20 20 14   Temp:   97.9 °F (36.6 °C)    TempSrc:   Oral    SpO2: 97% 98% 96% 95%   Weight:        02/06/19 2015 02/06/19 2035 02/06/19 2137 02/06/19 2142   BP: (!) 167/80 (!) 149/85 130/62 127/68   Pulse: 85 82 92 91   Resp: 16 19 13 14   Temp:       TempSrc:       SpO2: 97% 97% 95% (!) 92%   Weight:        02/06/19 2147 02/06/19 2152 02/06/19 2200   BP: 117/63 119/66 118/68   Pulse: 93 92 92   Resp: 17 16 13   Temp:      TempSrc:      SpO2: (!) 93% (!) 93% (!) 94%   Weight:        Abnormal Lab Results:  Labs Reviewed   COMPREHENSIVE METABOLIC PANEL - Abnormal; Notable for the following components:       Result Value    CO2 20 (*)     Creatinine 1.5 (*)     eGFR if non  52.4 (*)     All other components within normal limits   CBC W/ AUTO DIFFERENTIAL   PROTIME-INR   TSH   APTT   TROPONIN I   B-TYPE NATRIURETIC PEPTIDE   LIPID PANEL   POCT GLUCOSE        All  Lab Results:  Results for orders placed or performed during the hospital encounter of 02/06/19   CBC W/ AUTO DIFFERENTIAL   Result Value Ref Range    WBC 8.96 3.90 - 12.70 K/uL    RBC 5.12 4.60 - 6.20 M/uL    Hemoglobin 15.8 14.0 - 18.0 g/dL    Hematocrit 46.0 40.0 - 54.0 %    MCV 90 82 - 98 fL    MCH 30.9 27.0 - 31.0 pg    MCHC 34.3 32.0 - 36.0 g/dL    RDW 12.3 11.5 - 14.5 %    Platelets 260 150 - 350 K/uL    MPV 10.3 9.2 - 12.9 fL    Gran # (ANC) 4.9 1.8 - 7.7 K/uL    Lymph # 2.9 1.0 - 4.8 K/uL    Mono # 0.6 0.3 - 1.0 K/uL    Eos # 0.4 0.0 - 0.5 K/uL    Baso # 0.10 0.00 - 0.20 K/uL    Gran% 54.9 38.0 - 73.0 %    Lymph% 32.7 18.0 - 48.0 %    Mono% 7.1 4.0 - 15.0 %    Eosinophil% 3.9 0.0 - 8.0 %    Basophil% 1.1 0.0 - 1.9 %    Differential Method Automated    Comprehensive metabolic panel   Result Value Ref Range    Sodium 140 136 - 145 mmol/L    Potassium 4.5 3.5 - 5.1 mmol/L    Chloride 108 95 - 110 mmol/L    CO2 20 (L) 23 - 29 mmol/L    Glucose 82 70 - 110 mg/dL    BUN, Bld 15 6 - 20 mg/dL    Creatinine 1.5 (H) 0.5 - 1.4 mg/dL    Calcium 10.1 8.7 - 10.5 mg/dL    Total Protein 7.3 6.0 - 8.4 g/dL    Albumin 4.2 3.5 - 5.2 g/dL    Total Bilirubin 0.6 0.1 - 1.0 mg/dL    Alkaline Phosphatase 101 55 - 135 U/L    AST 27 10 - 40 U/L    ALT 40 10 - 44 U/L    Anion Gap 12 8 - 16 mmol/L    eGFR if African American >60.0 >60 mL/min/1.73 m^2    eGFR if non African American 52.4 (A) >60 mL/min/1.73 m^2   Protime-INR   Result Value Ref Range    Prothrombin Time 9.8 9.0 - 12.5 sec    INR 0.9 0.8 - 1.2   TSH   Result Value Ref Range    TSH 1.990 0.400 - 4.000 uIU/mL   APTT   Result Value Ref Range    aPTT 23.1 21.0 - 32.0 sec   Troponin I   Result Value Ref Range    Troponin I <0.006 0.000 - 0.026 ng/mL   B-Type natriuretic peptide   Result Value Ref Range    BNP <10 0 - 99 pg/mL   POCT glucose   Result Value Ref Range    POCT Glucose 82 70 - 110 mg/dL       The EKG was ordered, reviewed, and independently interpreted by the ED  provider.  EKG:  Rate is 92 beats per minute.  Left axis deviation.  Sinus rhythm. No acute ST or T-wave changes noted         Imaging Results:  Imaging Results          CT Head Without Contrast (Final result)  Result time 02/06/19 21:04:28    Final result by Roosevelt Oro III, MD (02/06/19 21:04:28)                 Impression:      Interval development of a very small serpiginous shaped focus of increased in UA shin left frontal/frontoparietal region superiorly consistent with a small bleed.  Follow-up recommended.    All CT scans at this facility are performed  using dose modulation techniques as appropriate to performed exam including the following:  automated exposure control; adjustment of mA and/or kV according to the patients size (this includes techniques or standardized protocols for targeted exams where dose is matched to indication/reason for exam: i.e. extremities or head);  iterative reconstruction technique.      Electronically signed by: Roosevelt Oro MD  Date:    02/06/2019  Time:    21:04             Narrative:    EXAMINATION:  CT HEAD WITHOUT CONTRAST    CLINICAL HISTORY:  Seizure post TPA;    TECHNIQUE:  Standard non contrast CT scan of the brain.    COMPARISON:  Earlier the same day    FINDINGS:  Definite interval change.  There is now a small somewhat serpiginous shaped focus of increased attenuation in the left frontal/frontoparietal region superiorly consistent with a small focus of blood.  This is new since the earlier examination performed at 1548 hours.    Brain and ventricles are otherwise unremarkable and unchanged.    Immediately called the emergency room physician with these results at the time of the interpretation.                               US Lower Extremity Veins Bilateral (Final result)  Result time 02/06/19 18:31:43    Final result by Roosevelt Oro III, MD (02/06/19 18:31:43)                 Impression:      Negative for bilateral lower extremity  DVT.      Electronically signed by: Roosevelt Oro MD  Date:    02/06/2019  Time:    18:31             Narrative:    EXAMINATION:  US LOWER EXTREMITY VEINS BILATERAL    CLINICAL HISTORY:  Pain in both lower extremities    FINDINGS:  Grayscale and color Doppler sonography was formed through the bilateral lower extremities.    The bilateral lower extremity veins are widely patent with normal augmentation, and compressibility and respiratory variability.    Of note, the right posterior tibial vein cannot be seen.                               MRI Brain Without Contrast (In process)                X-Ray Chest AP Portable (Final result)  Result time 02/06/19 16:29:11    Final result by ANTHONY Felipe Sr., MD (02/06/19 16:29:11)                 Impression:      1. The lungs are clear.  2. The size of the heart is prominent.  This may be secondary to magnification.  .      Electronically signed by: Johnny Felipe MD  Date:    02/06/2019  Time:    16:29             Narrative:    EXAMINATION:  XR CHEST AP PORTABLE    CLINICAL HISTORY:  Weakness    COMPARISON:  07/06/2017    FINDINGS:  The size of the heart is prominent.  The lungs are clear. There is no pneumothorax.  The costophrenic angles are sharp.                               CT Head Without Contrast (Final result)  Result time 02/06/19 15:59:46    Final result by Roosevelt Sanchez MD (02/06/19 15:59:46)                 Impression:      No acute abnormality. If there is additional clinical concern for acute infarct, MRI with diffusion weighted imaging recommended.    All CT scans at this facility use dose modulation, iterative reconstruction, and/or weight based dosing when appropriate to reduce radiation dose to as low as reasonable achievable.      Electronically signed by: Roosevelt Sanchez MD  Date:    02/06/2019  Time:    15:59             Narrative:    EXAMINATION:  CT HEAD WITHOUT CONTRAST    CLINICAL HISTORY:  Motor neuron disease;Slurred  speech;    TECHNIQUE:  Low dose axial CT images obtained throughout the head without intravenous contrast. Sagittal and coronal reconstructions were performed.    All CT scans at this facility use dose modulation, iterative reconstruction, and/or weight based dosing when appropriate to reduce radiation dose to as low as reasonable achievable.    COMPARISON:  None.    FINDINGS:  Intracranial compartment:    The brain parenchyma appears normal. No parenchymal mass, hemorrhage, edema or major vascular distribution infarct.    Ventricles and sulci are normal in size for age without evidence of hydrocephalus.    No extra-axial blood or fluid collections.    Skull/extracranial contents (limited evaluation): No fracture. Mucous retention cysts in the maxillary antra bilaterally. The remaining paranasal sinuses and mastoid air cells are clear.                                 The Emergency Provider reviewed the vital signs and test results, which are outlined above.     ED Discussion     4;10 PM  Spoke with Dr. Aleman, he recommended stat MRI of the head.  If MRI shows CVA recommends tPA.      4:11 PM Radiology notified of need for stat MRI.     4:15 PM patient denies any metal in his body/pacers/AICD    4:16 PM patient denies any back pain, chest pains at a near tolerable level.    5:54 PM Awaiting on MRI reading.  Spoke with radiology (Gabriel) regarding needing MRI report.     5:58 PM Spoke with Dr. Oro:  No acute abnormality on MRI  _ Report given on PAC.      18:00 Care to Dr. Lucero.  Reviewed MRI - ? Area of enhancement.  Will discuss with Dr. Oro to review MRI again.    The patient was received from the off-going emergency room physician Dr Daley at 6:00PM.  All pertinent details presently available from the patient encounter.    Although the initial report from radiology demonstrates no sign of acute pathology, I have concerns following my personal review of the films that there may be an area of  infarction in the left upper parietal region.  Accordingly I placed a call to radiology for review.    Dr Oro reviewed the films with me via telephone and does agree that this area represents infarction.  Accordingly additional calls were placed to telestroke neurology.  I spoke personally with  who has taken over for Dr. Aleman.  He agrees with our assessment of acute CVA and agrees with plan to initiate tPA immediately.  The patient is now roughly 3.5 hours post onset of event.  Although he has some improvement, the patient is still unable to lift the right upper extremity off of the bed though he now has 4/5 strength at the biceps and triceps as well as the wrist.  There is still ataxia at the fingers and hand.  The lower extremity seems to be improved as well though it still rate is strength as a 4/5 at the hip, knee, and ankle.  The inherent risk of thrombolysis was discussed with the patient along with the indications and alternatives for management.  He and accompanying family agreed to proceed accordingly.  I additionally discussed plans for disposition with the neurologist.  He feels that the patient would not likely knee neuro interventional our nurse surgical services and therefore is suitable for placement at [facility] in [place] route as the patient currently desires.    All historical, clinical, radiographic, and laboratory findings were reviewed with the patient/family in detail along with the indications for transport to the facility in [place] in order to receive ongoing cardiac and neuro monitoring as well as carotid ultrasound and echocardiography to rule out potential sources for the infarction.  All remaining questions and concerns were addressed at this time and the patient/family communicates understanding and agrees to proceed accordingly.      I discussed the pertinent details of the encounter with the on-call neurologist in [place], Dr. Medina, who agrees that the patient  is suitable for admission at our facility.  Similarly, all pertinent details of the encounter were discussed with Dr. Paz who agrees to receive the patient at [facility] ICU for further care as outlined above.  The patient will be transferred by [facility] ambulance services secondary to a need for ongoing cardiac and neuro monitoring en route.  Jacques Lucero MD  8:12 PM    In the process of being loaded up to transport via EMS, the patient developed right-sided tonic clonic activity for approximately 2 min duration.  This activity spontaneously arrested.  We did provide patient with Ativan 2 mg thereafter.  Given concern for complication the patient was taken immediately to CT.  Findings appear to be consistent with possible early SAH.    Findings discussed with Dr Oro who agrees this may represent early hemorrhage though it it somewhat uncertain.      Findings were next neuro surgery capable facility discussed with Dr. Rowan of neurology.  He agrees this is likely early hemorrhage and recommends transfer to a  Neurosurgical capable facility.  TPA infusion is completed.  He does not recommend use of TXA or other means of reversal at this time.    [facility] as discussed above.  We are currently awaiting call back.  All of these details have been discussed with the patient and accompanying family.  All questions have been answered at this time.  The patient has not developed any new neurological deficits and in fact his weakness in the right upper extremity continues to improve.  Speech remains appropriate as is his mental status.  No further seizure activity has been noted.    All historical, clinical, radiographic, and laboratory findings were reviewed with the patient/family in detail along with the indications for transfer to an outside facility (rather than admission to our  facility in [place]) secondary to new development of possible SAH and a need for Neurosurgical capabillity given the diagnosis of CVA with post TPA hemorrhage and seizure.  All remaining questions and concerns were addressed at that time and the patient/family communicates understanding and agrees to proceed accordingly.  Similarly all pertinent details of the encounter were discussed with Dr Brewer at [facility] ED via [facility] who agrees to accept the patient in transfer based on the needs/patient preferences outlined above.  [facility] secondary to a need for ongoing cardiac monitoring en route.  Jacques Lucero MD  9:26 PM     ED Medication(s):  Medications   sodium chloride 0.9% bolus 1,000 mL (0 mLs Intravenous Stopped 2/6/19 1853)   ALTEPLASE IV BOLUS bolus from vial 9 mg (0 mg Intravenous Stopped 2/6/19 1842)   alteplase (ACTIVASE) 100 mg injection 81 mg (0 mg Intravenous Stopped 2/6/19 1945)   lorazepam (ATIVAN) injection 2 mg (2 mg Intravenous Given 2/6/19 2039)          Medication List      ASK your doctor about these medications    amitriptyline 25 MG tablet  Commonly known as:  ELAVIL     aspirin 81 MG EC tablet  Commonly known as:  ECOTRIN     atorvastatin 40 MG tablet  Commonly known as:  LIPITOR     lisinopril 20 MG tablet  Commonly known as:  PRINIVIL,ZESTRIL     meloxicam 7.5 MG tablet  Commonly known as:  MOBIC     metoprolol succinate 50 MG 24 hr tablet  Commonly known as:  TOPROL-XL     multivitamin capsule     tiZANidine 4 MG tablet  Commonly known as:  ZANAFLEX               MIPS Measures      Medical Decision Making     Medical Decision Making:   Initial Assessment:   Patient is a 53-year-old male who presents to emergency department with right upper extremity, right lower extremity, chest pain, and twitching.  Onset 3:00 p.m..  Differential Diagnosis:   CVA, PE, dissection, coronary artery disease, malingering  Clinical Tests:   Lab Tests:  Ordered and Reviewed  Radiological Study: Ordered and Reviewed  Medical Tests: Ordered and Reviewed  ED Management:            Clinical Impression       ICD-10-CM ICD-9-CM   1. Left-sided cerebrovascular accident (CVA) I63.9 436   2. Right sided weakness R53.1 728.87   3. Calf pain M79.669 729.5   4. Chest pain, unspecified type R07.9 786.50   5. Hypertension, unspecified type I10 401.9   6. Seizure R56.9 780.39   7. Subarachnoid bleed I60.9 430            Jacques Lucero MD  02/06/19 2020       Jacques Lucero MD  02/07/19 0229

## 2019-02-07 LAB
CHOLEST SERPL-MCNC: 106 MG/DL
CHOLEST/HDLC SERPL: 3.8 {RATIO}
HDLC SERPL-MCNC: 28 MG/DL
HDLC SERPL: 26.4 %
LDLC SERPL CALC-MCNC: 13.8 MG/DL
NONHDLC SERPL-MCNC: 78 MG/DL
TRIGL SERPL-MCNC: 321 MG/DL

## 2019-02-07 NOTE — ED NOTES
Patient sitting up in bed. Denies any complaints at this time. Talking with family. VSS. Will continue to monitor.

## 2019-02-07 NOTE — ED NOTES
Pt to CT at this time via [facility]. Pt escorted by Gabriel HERNANDEZ, Terri HERNANDEZ, [facility] crew.

## 2019-02-07 NOTE — ASSESSMENT & PLAN NOTE
53M w/ severe chest pain as he reached back to grab a hat while driving, chest pain lasted for several minutes, and then started to have right sided paralysis and inability to speak. He is improving since this started but still having right sided weakness. The aforementioned symptoms have never happened before. There are no identified triggers or modifying factors. There have been no recurrent events. There are no other associated symptoms.    Follow up post MRI and initial discussion and examination with Dr Aleman    MRI reviewed, left MCA DWI-ADC indicative of acute ischemic infarction, FLAIR changes on left parietal lobe    Antithrombotics for secondary stroke prevention: Antiplatelets: Aspirin: 325 mg daily    Statins for secondary stroke prevention and hyperlipidemia, if present:   Statins: Atorvastatin- 40 mg daily    Aggressive risk factor modification: HTN, DM, HLD, Diet, Exercise     Rehab efforts: PT/OT/SLP to evaluate and treat    Diagnostics ordered/pending: CTA Head to assess vasculature , CTA Neck/Arch to assess vasculature, HgbA1C to assess blood glucose levels, Lipid Profile to assess cholesterol levels, TTE to assess cardiac function/status , TSH to assess thyroid function    VTE prophylaxis: Heparin 5000 units SQ every 8 hours  None: Reason for No Pharmacological VTE Prophylaxis: 24 hours after tPA    BP parameters: Infarct: Post tPA, SBP <180

## 2019-02-07 NOTE — CONSULTS
[facility]  Vascular Neurology  Comprehensive Stroke Center  Tele-Consultation Note      Consults    Consulting Provider: Spoke Physician:: lisbet moore  Current Providers  No providers found    Patient Location: [facility]  Spoke hospital nurse at bedside with patient assisting consultant.     Patient information was obtained from ED MD.       Assessment/Plan:     STROKE DOCUMENTATION     Acute Stroke Times:   Acute Stroke Times   Stroke Team Called Time: 1559  Stroke Team Arrival Time: 1559  CT Interpretation Time: 1559    NIH Scale:        Modified [place]    [place] Coma Scale:    ABCD2 Score:    [id number] Score:   HAS -BLED Score:   ICH Score:   Hunt & Morrow Classification:       Diagnoses:   Acute ischemic left MCA stroke    53M w/ severe chest pain as he reached back to grab a hat while driving, chest pain lasted for several minutes, and then started to have right sided paralysis and inability to speak. He is improving since this started but still having right sided weakness. The aforementioned symptoms have never happened before. There are no identified triggers or modifying factors. There have been no recurrent events. There are no other associated symptoms.    Follow up post MRI and initial discussion and examination with Dr Aleman    MRI reviewed, left MCA DWI-ADC indicative of acute ischemic infarction, FLAIR changes on left parietal lobe    Antithrombotics for secondary stroke prevention: Antiplatelets: Aspirin: 325 mg daily    Statins for secondary stroke prevention and hyperlipidemia, if present:   Statins: Atorvastatin- 40 mg daily    Aggressive risk factor modification: HTN, DM, HLD, Diet, Exercise     Rehab efforts: PT/OT/SLP to evaluate and treat    Diagnostics ordered/pending: CTA Head to assess vasculature , CTA Neck/Arch to assess vasculature, HgbA1C to assess blood glucose levels, Lipid Profile to assess cholesterol  levels, TTE to assess cardiac function/status , TSH to assess thyroid function    VTE prophylaxis: Heparin 5000 units SQ every 8 hours  None: Reason for No Pharmacological VTE Prophylaxis: 24 hours after tPA    BP parameters: Infarct: Post tPA, SBP <180             Blood pressure (!) 142/78, pulse 81, temperature 97.8 °F (36.6 °C), temperature source Oral, resp. rate 16, weight 130.2 kg (287 lb 0.6 oz), SpO2 97 %.  Alteplase Eligible?: Yes  Alteplase Recommendation:   Alteplase Total Dose:   Total dose: Alteplase 0.9mg/kg (max dose:90mg)                      ** based on acquired weight from facility   Bolus Dose:   10% of total Alteplase dose given intravenously over 1 minute   Continuous Infusion Dose:   Remaining 90% of total Alteplase dose infused intravenously over 60 minutes    **infusion must start at the same time as the bolus dose     Additional Recommendations:   1. Neurological assessment and vital signs (except temperature) every 15 minutes during Altaplase infusion.  2. Frequency of BP assessments may need to be increased if systolic BP stays >= 180 mm Hg or diastolic BP stays >= 105 mm Hg. Administer antihypertensive meds as ordered  3. Continue to monitor and control blood pressure and monitor for neurological deterioration every 15 minutes for the first hour after the infusion is stopped. Then every 30 minutes for the next 6 hours. Perform hourly monitoring from the 8th post-infusion hour until 24 hours post-infusion.  4. Temperature every 4 hours or as required.  5. Follow hospital protocol for further orders re: post tPA infusion patient management.  6. No antithrombotics or anticoagulants (including but not limited to: heparin, warfarin, aspirin, clopidigrel, or dipyridamole) for 24 hours, then start antithrombotics as ordered by treating physician    Adapted from the American Heart Association/American Stroke Association (AHA/ASA) and American Association of Neuroscience Nurses (AANN) Guidelines.    Possible Interventional Revascularization Candidate? small infarct on MRI    Disposition Recommendation: transfer to nearest appropriate facility       Subjective:     History of Present Illness:  53M w/ severe chest pain as he reached back to grab a hat while driving, chest pain lasted for several minutes, and then started to have right sided paralysis and inability to speak. He is improving since this started but still having right sided weakness. The aforementioned symptoms have never happened before. There are no identified triggers or modifying factors. There have been no recurrent events. There are no other associated symptoms.    Follow up post MRI    No new subjective & objective note has been filed under this hospital service since the last note was generated.      Recommended the emergency room physician to have a brief discussion with the patient and/or family if available regarding the risks and benefits of treatment, and to briefly document the occurrence of that discussion in his clinical encounter note.     The attending portion of this evaluation, treatment, and documentation was performed per Wali Rowan MD via audio only.    Billing code:  (moderate to severe stroke, large areas of edema, some mimics)    · This patient has a critical neurological condition/illness, with high morbidity and mortality.  · There is a high probability for acute neurological change leading to clinical and possibly life-threatening deterioration requiring highest level of physician preparedness for urgent intervention.  · Care was coordinated with other physicians involved in the patient's care.  · Radiologic studies and laboratory data were reviewed and interpreted, and plan of care was re-assessed based on the results.  · Diagnosis, treatment options and prognosis may have been discussed with the patient and/or family members or caregiver.  · Further advanced medical management and further evaluation is  warranted for his care.      In your opinion, this was a: Tier 1    Consult End Time: 6:38 PM     Wali Rowan MD  Comprehensive Stroke Center  Vascular Neurology   [facility]

## 2019-02-07 NOTE — ED NOTES
Spoke with dispatcher at [facility]. Need helicopter deployed to bring patient to either [facility] or [facility]. Still pending accepting provider with [facility].

## 2019-02-07 NOTE — ED NOTES
Spoke with Glenn at [facility]. Informed patient would not go by [facility] because of weather. Awaiting call back.

## 2019-02-07 NOTE — ED NOTES
Patient resting comfortably. Denies pain at this time. Small abrasion noted to left lateral forearm. Minimal bleeding noted. GCS 15. PERRLA.

## 2019-02-07 NOTE — ED NOTES
[facility] called back stating [facility] unable to deploy. [facility] is setting up [facility] ground transport via [facility].

## 2019-02-07 NOTE — ED NOTES
Dr. Lucero speaking with Dr. Norman from [facility]; agreed that patient is a candidate for TPA and discussion between Dr. Lucero and neurologist on whether patient needs to go to [facility] or [facility]

## 2019-03-19 ENCOUNTER — OFFICE VISIT (OUTPATIENT)
Dept: NEUROLOGY | Facility: CLINIC | Age: 54
End: 2019-03-19
Payer: COMMERCIAL

## 2019-03-19 VITALS
HEIGHT: 70 IN | WEIGHT: 286.63 LBS | DIASTOLIC BLOOD PRESSURE: 83 MMHG | HEART RATE: 83 BPM | BODY MASS INDEX: 41.03 KG/M2 | SYSTOLIC BLOOD PRESSURE: 130 MMHG

## 2019-03-19 DIAGNOSIS — R56.9 PROVOKED SEIZURE: Primary | ICD-10-CM

## 2019-03-19 PROCEDURE — 99999 PR PBB SHADOW E&M-EST. PATIENT-LVL III: CPT | Mod: PBBFAC,,, | Performed by: STUDENT IN AN ORGANIZED HEALTH CARE EDUCATION/TRAINING PROGRAM

## 2019-03-19 PROCEDURE — 99999 PR PBB SHADOW E&M-EST. PATIENT-LVL III: ICD-10-PCS | Mod: PBBFAC,,, | Performed by: STUDENT IN AN ORGANIZED HEALTH CARE EDUCATION/TRAINING PROGRAM

## 2019-03-19 RX ORDER — HYDROCODONE BITARTRATE AND ACETAMINOPHEN 10; 325 MG/1; MG/1
1 TABLET ORAL
COMMUNITY
Start: 2018-07-18

## 2019-03-19 RX ORDER — NITROGLYCERIN 0.4 MG/1
0.4 TABLET SUBLINGUAL
COMMUNITY

## 2019-03-19 RX ORDER — ELETRIPTAN HYDROBROMIDE 20 MG/1
20 TABLET, FILM COATED ORAL DAILY PRN
COMMUNITY

## 2019-03-19 RX ORDER — GABAPENTIN 300 MG/1
300 CAPSULE ORAL DAILY
COMMUNITY

## 2019-03-19 RX ORDER — LEVETIRACETAM 500 MG/1
500 TABLET ORAL 2 TIMES DAILY
COMMUNITY
Start: 2019-03-09

## 2019-03-19 RX ORDER — CLOPIDOGREL BISULFATE 75 MG/1
75 TABLET ORAL DAILY
COMMUNITY

## 2019-03-19 NOTE — PATIENT INSTRUCTIONS
-Ok to wean off the Keppra per Dr. May's recommendation  -Agree with clearing patient for driving, no contraindication in setting of provoked event which may have been cortical irritability vs simple partial seizure (focal seizure without loss of consciousness) and has not recurred  -Can follow up with any neurologist for Stroke prevention  -Call if there is any concern for staring spells with patient

## 2019-03-19 NOTE — LETTER
March 19, 2019      [facility] - Neurology  [address]  [facility] [address]  Phone: [phone]  Fax: [phone]       Patient: Milton Sol   YOB: 1965  Date of Visit: 03/19/2019    To Whom It May Concern:    Milton Sol  was at [facility] on 03/19/2019. He may return to work/school on 3/19/2019 with no restrictions. He does not need to be on an anti-epileptic drug, such as Keppra, as his event was provoked by stroke w/ post-tPA bleeding.  He also did not lose consciousness with any of his events.  Due to provoked nature of event, he could have had cortical irritability at the time versus a few simple partial seizures over the course of a few hours.  No recurrence.  In either case, he does not carry a diagnosis of epilepsy.  He is instructed to call for any other symptoms he is concerned could be seizure though he is very low risk for any seizures.  He has been evaluated by 3 providers who agree on this conclusion, including a board-certified, board-educated epileptologist. If you have any questions or concerns, or if I can be of further assistance, please do not hesitate to contact me.    Sincerely,    Florence Renee MD

## 2019-03-24 NOTE — PROGRESS NOTES
NEUROLOGY OUTPATIENT CLINIC NOTE    Patient Name:  Milton Sol  Patient MRN:  [id number]    HPI:  Patient is a 53 y.o. male with PMHx of HLD, chronic low back pain with sciatica, HTN, and OA who presents to [facility] 3/24/2019 for concern of possible seizure after a stroke.  Patient was at work, is a  and occasionally supervises loading area.  Had been having a pretty normal day, noticed when he reached back to grab his hard hat from the back of his truck, his R arm and leg became weak.  He tried to call for help but was aphasic, honked the horn.  Coworkers gave him 2 nitroglycerin tablets he had.  He was taken to the nearest ED, telestroke consultation was done and the decision was made to give him IV tPA.  After this, his R-sided paralysis began to resolve but patient started to have RUE extension followed by rhythmic twitching.  This happened twice within 24 hours.  He was fully conscious for these episodes, but noticed immediately prior to jerking movement he had some paresthesias and then he did not have control of his RUE.  No previous hx of seizure, no family hx of seizures, no significant head trauma, no use of forceps/vacuum during birth, no infantile febrile seizures.  Patient was put on Keppra in the hospital and discharge on it as a precaution but not given a stop date.  He has just seen Dr. May and is here for an additional opinion on whether he needs to be on an AED at all given his partial seizure likely provoked by stroke with some post-tPA bleeding that has since resolved. He is also wanting to be cleared to drive.  The primary practitioner at his company just told him the generic guidelines for those with epilepsy and unprovoked seizures--no driving for 6 months while off AED. Dr. May is of the opinion that he can be weaned off of the Keppra and had no indication to be on the medication long-term in the first place so he should not have the 6 month  driving restriction.  Given review of his imaging, several accounts of the seizure indicating partial seizure activity corresponding to the location of the stroke with post-tPA bleed on MRI, and seizure activity occurring within 24 hour period only, patient should be cleared to drive.    ROS:  General:  No fever, no chills, no fatigue, no change in weight  HEENT:  No headache, no changes in vision  Respiratory:  No cough, no SOB  Cardiovascular:  No chest pain, no palpitations  GI:  No abdominal pain, no n/v/c/d  Skin:  No rashes, no pruritus, no wounds  Musculoskeletal:  No myalgias, + arthralgias, chronic LBP  Hematologic:  No easy bruising or bleeding  Neuro:  No tremors, no focal weakness, no paresthesias  Psych:  No anxiety, no depression    PMHx:  Patient Active Problem List   Diagnosis    Injury resulting from fall from height    Cellulitis of left lower leg    Hyperlipidemia    Neuropathy    Essential hypertension    Muscle spasms of lower extremity    Functional neurological symptom disorder with mixed symptoms    Acute ischemic left MCA stroke    Left-sided cerebrovascular accident (CVA)     PSHx:  Past Surgical History:   Procedure Laterality Date    APPENDECTOMY      BACK SURGERY      FEMUR SURGERY      KNEE SURGERY       Medications:  Current Outpatient Medications on File Prior to Visit   Medication Sig Dispense Refill    amitriptyline (ELAVIL) 25 MG tablet Take 25 mg by mouth every evening.      atorvastatin (LIPITOR) 40 MG tablet Take 40 mg by mouth.      clopidogrel (PLAVIX) 75 mg tablet Take 75 mg by mouth once daily.      eletriptan (RELPAX) 20 MG tablet Take 20 mg by mouth daily as needed.      gabapentin (NEURONTIN) 300 MG capsule Take 300 mg by mouth once daily.      HYDROcodone-acetaminophen (NORCO)  mg per tablet Take 1 tablet by mouth.      levETIRAcetam (KEPPRA) 500 MG Tab Take 500 mg by mouth 2 (two) times daily.      lisinopril (PRINIVIL,ZESTRIL) 20 MG tablet  "Take 20 mg by mouth.      metoprolol succinate (TOPROL-XL) 50 MG 24 hr tablet Take 50 mg by mouth.      multivitamin capsule Take 1 capsule by mouth.      nitroGLYCERIN (NITROSTAT) 0.4 MG SL tablet Place 0.4 mg under the tongue.      tiZANidine (ZANAFLEX) 4 MG tablet Take 4 mg by mouth 2 (two) times daily.      meloxicam (MOBIC) 7.5 MG tablet Take 7.5 mg by mouth.       No current facility-administered medications on file prior to visit.      Allergies:  Review of patient's allergies indicates:  No Known Allergies    Social Hx:  Patient is a , currently supervising due to driving restriction.  Lives with his wife.  Has a daughter who works as a nurse in the PICU at [facility].  Former smoker, former EtOH use--rare EtOH use since.  No illicits.    Physical Exam:  /83 (BP Location: Left arm, Patient Position: Sitting, BP Method: X-Large (Automatic))   Pulse 83   Ht 5' 10" (1.778 m)   Wt 130 kg (286 lb 9.6 oz)   BMI 41.12 kg/m²   General:  Well-developed, well-nourished, nad  HEENT:  NCAT, PERRL, EOMI, oropharygneal membranes non-erythematous/without exudate  Neck:  Supple, normal ROM without nuchal rigidity  Respiratory:  Symmetric expansion, no increased wob  CVS:  No LE edema.  Extremities warm/well-perfused  GI:  Abd soft, non-distended  Skin:  No visible rashes or wounds  Psych:  Pleasant, cooperative with exam.  Speech and thought content appropriate.  Neurologic Exam:  Mental Status:  AAOx3.  Converses easily.  Able to spell 'world' forward and backward.  Recent, remote recall 3/3.  Cranial Nerves:  PERRL, EOMI. Facial movement intact, symmetric. Tongue protrudes midline, palate raises symmetrically.  Trapezius 5/5 bilaterally.  Motor:  Normal muscle bulk and tone.  Strength 5/5 throughout.  Sensory:  Sensation intact to light touch at all extremities. Vibratory sensation intact and symmetric at BUE, BLE digits.  Reflexes:  Reflexes 2+--biceps, brachioradialis, patellar.  No " ankle clonus.  Coordination:  No resting tremor or myoclonus.  FTN, HTS, EFREM wnl--no ataxia, dysmetria, or dysdiadochokinesia.  Gait:  Appropriate gait, appropriate arm swing.  No shuffling, magnetic gait, imbalance, weakness, or foot drop noted.    Labs:  Results: CBC:   Lab Results   Component Value Date/Time    WBC 8.96 02/06/2019 04:00 PM    RBC 5.12 02/06/2019 04:00 PM    HGB 15.8 02/06/2019 04:00 PM    HCT 46.0 02/06/2019 04:00 PM    HCT 44 07/06/2017 06:42 PM     02/06/2019 04:00 PM    MCV 90 02/06/2019 04:00 PM    MCH 30.9 02/06/2019 04:00 PM    MCHC 34.3 02/06/2019 04:00 PM     CMP:   Lab Results   Component Value Date/Time    GLU 82 02/06/2019 04:00 PM    CALCIUM 10.1 02/06/2019 04:00 PM    ALBUMIN 4.2 02/06/2019 04:00 PM    PROT 7.3 02/06/2019 04:00 PM     02/06/2019 04:00 PM    K 4.5 02/06/2019 04:00 PM    CO2 20 (L) 02/06/2019 04:00 PM     02/06/2019 04:00 PM    BUN 15 02/06/2019 04:00 PM    CREATININE 1.5 (H) 02/06/2019 04:00 PM    ALKPHOS 101 02/06/2019 04:00 PM    ALT 40 02/06/2019 04:00 PM    AST 27 02/06/2019 04:00 PM    BILITOT 0.6 02/06/2019 04:00 PM     Imaging:  [facility] imaging reviewed--MRI, MRV brain.  02/06/19 17:32 MRI Brain w/o contrast:  Focus of increased signal adjacent to the central sulcus on the left side involving both motor and sensory strips consistent with either focal cortical ischemia/infarction or possibly related to seizure activity.    02/06/19 20:50 CT head w/o contrast:  Interval development of a very small serpiginous shaped focus of increased in UA shin left frontal/frontoparietal region superiorly consistent with a small bleed.  Follow-up recommended.    Additional Diagnotic Testing:  N/a    ASSESSMENT/PLAN:  Patient is a 53 y.o. male with a PMHx of HLD, chronic low back pain with sciatica, HTN, and OA who presents to [facility] 03/19/19 due to 1 event, possibly a provoked seizure due to stroke.    Partial Seizure s/p stroke with post-tPA  bleeding  -No indications for patient to have been kept on AED on discharge, no indications to continue  -Seizures were partial (no loss of consciousness), they were provoked, and all occurred within 24 hour period  -Agree with Dr. May's recommendations to wean off of Keppra and ok for work from my standpoint  -Long discussion with patient regarding need to call our clinic if he has anything suspicious for another seizure, he is agreeable to this and understands the risk should he fail to alert someone to any other possible seizure activity  -Letter to patient clearing him for duty, company to call with any questions    Florence Renee MD  Pager:  [phone], [place] [phone]

## 2023-07-28 NOTE — ED NOTES
Dr. Cervantes with [facility] is accepting provider.   Patient : Yanely Wright Age: 81 year old Sex: female   MRN: [id number] Encounter Date: 7/28/2023    History     Chief Complaint   Patient presents with   • Diarrhea       HPI    Yanely Wright is a 81 year old female presenting to the emergency department with complaint of lower abdominal pain, diarrhea, and nausea.  Patient states that she was diagnosed with diverticulitis on July 1st and was treated with Augmentin.    She states she had to take a probiotic because it gave her diarrhea, but she did complete all the antibiotics.    She states she woke up this morning and had the start of abdominal pain that has worsened throughout the day.  Currently she rates this pain a 4:10. She states this is just a constant pain that is very uncomfortable, hard to describe.  She reports that she did note a temperature of 99.5° F. She denies cough or cold symptoms.  Nauseated but has not vomited.  Has not felt like eating.  She denies dysuria.  She denies chest pain or shortness of breath.  She did take a g of Tylenol at 3:15 p.m..  Patient reports only history of diverticulitis was this July 1st.  She states that she has been on soft diet because of the diarrhea from the Augmentin , reports today had 1 normal bowel movement and then it became loose stool that is brown and pudding like; she denies seeing blood in stool.    Patient states she is not had a colonoscopy for many years.  I did review patient's ER visit where she was diagnosed with diverticulitis from July 1st.    Allergies   Allergen Reactions   • Amoxicillin-Pot Clavulanate DIARRHEA       No current facility-administered medications for this encounter.     Current Outpatient Medications   Medication Sig   • ciprofloxacin (Cipro) 500 MG tablet Take 1 tablet by mouth every 12 hours for 7 days.   • metroNIDAZOLE (FLAGYL) 500 MG tablet Take 1 tablet by mouth every 8 hours for 7 days.   • pantoprazole (PROTONIX) 40 MG tablet TAKE 1 TABLET EVERY DAY   • quinapril  (ACCUPRIL) 10 MG tablet Take 1 tablet by mouth in the morning and 1 tablet in the evening.   • clobetasol (TEMOVATE) 0.05 % cream APPLY 1 APPLICATION TOPICALLY IN THE MORNING AND EVENING   • Probiotic Product (PROBIOTIC DAILY PO) Take 1 tablet by mouth daily.   • ondansetron (ZOFRAN ODT) 4 MG disintegrating tablet Place 1 tablet onto the tongue every 8 hours as needed for Nausea.   • allopurinol (ZYLOPRIM) 300 MG tablet TAKE 1 TABLET EVERY DAY   • hydrochlorothiazide (MICROZIDE) 12.5 MG capsule TAKE 1 CAPSULE EVERY DAY   • levothyroxine 112 MCG tablet TAKE 1 TABLET EVERY DAY   • pravastatin (PRAVACHOL) 10 MG tablet TAKE 1 TABLET EVERY NIGHT   • Ascorbic Acid (VITAMIN C PO) Take 1 tablet by mouth daily.   • Carboxymethylcellulose Sodium (EYE DROPS OP) Place 1 drop into both eyes 2 times daily as needed (Dry Eyes).   • diclofenac (VOLTAREN) 1 % gel APPLY 2 GRAMS TOPICALLY FOUR TIMES A DAY TO AFFECTED AREA.   • DISPENSE New breast prothesis dx:previous mastectomy  patient wears size C or 8   • Cholecalciferol (VITAMIN D) 1000 UNITS capsule Take 5,000 Units by mouth daily.    • OMEGA 3 1000 MG capsule Take 1,000 mg by mouth daily.        Past Medical History:   Diagnosis Date   • Arthritis of left hip 08/10/2016    8/2017 hip replacement with great improvement   • Breast cancer (CMD) left breast mastectomy    8/2017 remission 30 years, annual mammogram   • Cataract 04/28/2015 8/2017 stable, surgical repair   • COAG (chronic open-angle glaucoma) 04/28/2015 8/2017 sees Dr. Ahumada   • Gout     8/2017 without flare, believes good diet and water intake help   • High cholesterol     8/2017 controlled, last Lipid panel 1/2017, active, diet   • HTN (hypertension)     8/2017 controlled   • Hypothyroidism     8/2017 euthyroid, will check level in Feb 2018   • Lymphedema of arm left arm    PLEASE DO NOT USE LEFT ARM FOR BP, BLOOD DRAWS- 8/2017 chronic   • PMR (polymyalgia rheumatica) (CMD)     8/2017 in remission       Past  Surgical History:   Procedure Laterality Date   • Appendectomy     • Cataract extraction w/ intraocular lens implant  6/3/15    Right eye   • Cataract extraction w/ intraocular lens implant  7-1-15    LEFT eye   • Joint replacement     • Mastectomy  left breast   • Past surgical history      PSH of jennifer pin removal- right abdomen   • Total hip arthroplasty Left 09/2017   • Total hip arthroplasty Right 01/12/2021       Family History   Problem Relation Age of Onset   • Cancer Mother         70s   • Cancer Father         70s   • Cancer Son         lymphoma nonhodgkins    • Osteoarthritis Brother         Juvenile RA       Social History     Tobacco Use   • Smoking status: Never     Passive exposure: Past   • Smokeless tobacco: Never   Vaping Use   • Vaping Use: never used   Substance Use Topics   • Alcohol use: No     Alcohol/week: 0.0 standard drinks of alcohol     Comment: rarely   • Drug use: No       Review of Systems     Review of Systems   Constitutional: Negative for fever.   HENT: Negative for congestion, rhinorrhea and sore throat.    Respiratory: Negative for cough and shortness of breath.    Cardiovascular: Negative for chest pain.   Gastrointestinal: Positive for abdominal pain, diarrhea and nausea. Negative for blood in stool and vomiting.   Genitourinary: Negative for dysuria.       Physical Exam     ED Triage Vitals   ED Triage Vitals Group      Temp 07/28/23 1721 98.3 °F (36.8 °C)      Heart Rate 07/28/23 1721 82      Resp 07/28/23 1721 20      BP 07/28/23 1721 (!) 194/92      SpO2 07/28/23 1721 95 %      EtCO2 mmHg --       Height 07/28/23 2110 4' 11\" (1.499 m)      Weight 07/28/23 2110 139 lb 15.9 oz (63.5 kg)      Weight Scale Used 07/28/23 2110 Scale in bed      BMI (Calculated) 07/28/23 2110 28.27      IBW/kg (Calculated) 07/28/23 2110 43.2       Physical Exam  Vitals and nursing note reviewed.   Constitutional:       Appearance: Normal appearance.   HENT:      Head: Normocephalic.      Nose: Nose  normal.   Eyes:      Extraocular Movements: Extraocular movements intact.      Pupils: Pupils are equal, round, and reactive to light.   Cardiovascular:      Rate and Rhythm: Normal rate and regular rhythm.      Pulses: Normal pulses.   Pulmonary:      Effort: Pulmonary effort is normal.      Breath sounds: Normal breath sounds.   Abdominal:      General: Bowel sounds are normal.      Palpations: Abdomen is soft.      Tenderness: There is abdominal tenderness.      Comments: Patient is significantly tender over the right upper quadrant, right lower quadrant and left lower quadrant.  She indicates most the pain is in the lower abdomen.  Not significantly tender at the left upper quadrant   Musculoskeletal:         General: Normal range of motion.      Cervical back: Normal range of motion.   Skin:     General: Skin is warm and dry.   Neurological:      Mental Status: She is alert and oriented to person, place, and time.   Psychiatric:         Behavior: Behavior normal.           Procedures     Procedures    Lab Results     Results for orders placed or performed during the hospital encounter of 07/28/23   Comprehensive Metabolic Panel   Result Value Ref Range    Fasting Status      Sodium 139 135 - 145 mmol/L    Potassium 3.5 3.4 - 5.1 mmol/L    Chloride 102 97 - 110 mmol/L    Carbon Dioxide 26 21 - 32 mmol/L    Anion Gap 15 7 - 19 mmol/L    Glucose 107 (H) 70 - 99 mg/dL    BUN 14 6 - 20 mg/dL    Creatinine 0.65 0.51 - 0.95 mg/dL    Glomerular Filtration Rate 88 >=60    BUN/Cr 22 7 - 25    Calcium 9.4 8.4 - 10.2 mg/dL    Bilirubin, Total 0.3 0.2 - 1.0 mg/dL    GOT/AST 16 <=37 Units/L    GPT/ALT 14 <64 Units/L    Alkaline Phosphatase 74 45 - 117 Units/L    Albumin 3.2 (L) 3.6 - 5.1 g/dL    Protein, Total 7.1 6.4 - 8.2 g/dL    Globulin 3.9 2.0 - 4.0 g/dL    A/G Ratio 0.8 (L) 1.0 - 2.4   Lipase   Result Value Ref Range    Lipase 84 73 - 393 Units/L   Urinalysis & Reflex Microscopy With Culture If Indicated   Result Value  Ref Range    COLOR, URINALYSIS Yellow     APPEARANCE, URINALYSIS Clear     GLUCOSE, URINALYSIS Negative Negative mg/dL    BILIRUBIN, URINALYSIS Negative Negative    KETONES, URINALYSIS Negative Negative mg/dL    SPECIFIC GRAVITY, URINALYSIS <1.005 (L) 1.005 - 1.030    OCCULT BLOOD, URINALYSIS Negative Negative    PH, URINALYSIS 6.5 5.0 - 7.0    PROTEIN, URINALYSIS Negative Negative mg/dL    UROBILINOGEN, URINALYSIS 0.2 0.2, 1.0 mg/dL    NITRITE, URINALYSIS Negative Negative    LEUKOCYTE ESTERASE, URINALYSIS Trace (A) Negative    SQUAMOUS EPITHELIAL, URINALYSIS 1 to 5 None Seen, 1 to 5 /hpf    ERYTHROCYTES, URINALYSIS 1 to 2 None Seen, 1 to 2 /hpf    LEUKOCYTES, URINALYSIS 1 to 5 None Seen, 1 to 5 /hpf    BACTERIA, URINALYSIS Few (A) None Seen /hpf    HYALINE CASTS, URINALYSIS 1 to 5 None Seen, 1 to 5 /lpf    MUCUS Present    CBC with Automated Differential (performable only)   Result Value Ref Range    WBC 14.4 (H) 4.2 - 11.0 K/mcL    RBC 4.11 4.00 - 5.20 mil/mcL    HGB 12.3 12.0 - 15.5 g/dL    HCT 37.8 36.0 - 46.5 %    MCV 92.0 78.0 - 100.0 fl    MCH 29.9 26.0 - 34.0 pg    MCHC 32.5 32.0 - 36.5 g/dL    RDW-CV 15.1 (H) 11.0 - 15.0 %    RDW-SD 51.4 (H) 39.0 - 50.0 fL     140 - 450 K/mcL    NRBC 0 <=0 /100 WBC    Neutrophil, Percent 86 %    Lymphocytes, Percent 8 %    Mono, Percent 5 %    Eosinophils, Percent 1 %    Basophils, Percent 0 %    Immature Granulocytes 0 %    Absolute Neutrophils 12.3 (H) 1.8 - 7.7 K/mcL    Absolute Lymphocytes 1.2 1.0 - 4.0 K/mcL    Absolute Monocytes 0.8 0.3 - 0.9 K/mcL    Absolute Eosinophils  0.1 0.0 - 0.5 K/mcL    Absolute Basophils 0.0 0.0 - 0.3 K/mcL    Absolute Immature Granulocytes 0.1 0.0 - 0.2 K/mcL   Gold Top   Result Value Ref Range    Extra Tube Hold for Add Ons    Lactic Acid Venous With Reflex   Result Value Ref Range    Lactate, Venous 1.2 0.0 - 2.0 mmol/L   Light Green Top   Result Value Ref Range    Extra Tube Hold for Add Ons    Comprehensive Metabolic Panel    Result Value Ref Range    Fasting Status      Sodium 142 135 - 145 mmol/L    Potassium 3.5 3.4 - 5.1 mmol/L    Chloride 107 97 - 110 mmol/L    Carbon Dioxide 25 21 - 32 mmol/L    Anion Gap 14 7 - 19 mmol/L    Glucose 107 (H) 70 - 99 mg/dL    BUN 9 6 - 20 mg/dL    Creatinine 0.66 0.51 - 0.95 mg/dL    Glomerular Filtration Rate 88 >=60    BUN/Cr 14 7 - 25    Calcium 8.7 8.4 - 10.2 mg/dL    Bilirubin, Total 0.5 0.2 - 1.0 mg/dL    GOT/AST 10 <=37 Units/L    GPT/ALT 12 <64 Units/L    Alkaline Phosphatase 64 45 - 117 Units/L    Albumin 2.7 (L) 3.6 - 5.1 g/dL    Protein, Total 6.2 (L) 6.4 - 8.2 g/dL    Globulin 3.5 2.0 - 4.0 g/dL    A/G Ratio 0.8 (L) 1.0 - 2.4   Magnesium   Result Value Ref Range    Magnesium 1.7 1.7 - 2.4 mg/dL   CBC with Automated Differential (performable only)   Result Value Ref Range    WBC 10.3 4.2 - 11.0 K/mcL    RBC 3.64 (L) 4.00 - 5.20 mil/mcL    HGB 11.1 (L) 12.0 - 15.5 g/dL    HCT 33.7 (L) 36.0 - 46.5 %    MCV 92.6 78.0 - 100.0 fl    MCH 30.5 26.0 - 34.0 pg    MCHC 32.9 32.0 - 36.5 g/dL    RDW-CV 15.2 (H) 11.0 - 15.0 %    RDW-SD 52.2 (H) 39.0 - 50.0 fL     140 - 450 K/mcL    NRBC 0 <=0 /100 WBC    Neutrophil, Percent 82 %    Lymphocytes, Percent 10 %    Mono, Percent 7 %    Eosinophils, Percent 1 %    Basophils, Percent 0 %    Immature Granulocytes 0 %    Absolute Neutrophils 8.5 (H) 1.8 - 7.7 K/mcL    Absolute Lymphocytes 1.0 1.0 - 4.0 K/mcL    Absolute Monocytes 0.7 0.3 - 0.9 K/mcL    Absolute Eosinophils  0.1 0.0 - 0.5 K/mcL    Absolute Basophils 0.0 0.0 - 0.3 K/mcL    Absolute Immature Granulocytes 0.0 0.0 - 0.2 K/mcL   Gold Top   Result Value Ref Range    Extra Tube Hold for Add Ons    C Reactive Protein   Result Value Ref Range    C-Reactive Protein 6.8 (H) <=1.0 mg/dL   Magnesium   Result Value Ref Range    Magnesium 1.8 1.7 - 2.4 mg/dL   Phosphorus   Result Value Ref Range    Phosphorus 3.1 2.4 - 4.7 mg/dL   Basic Metabolic Panel   Result Value Ref Range    Fasting Status       Sodium 140 135 - 145 mmol/L    Potassium 3.8 3.4 - 5.1 mmol/L    Chloride 106 97 - 110 mmol/L    Carbon Dioxide 25 21 - 32 mmol/L    Anion Gap 13 7 - 19 mmol/L    Glucose 86 70 - 99 mg/dL    BUN 7 6 - 20 mg/dL    Creatinine 0.64 0.51 - 0.95 mg/dL    Glomerular Filtration Rate 89 >=60    BUN/Cr 11 7 - 25    Calcium 9.2 8.4 - 10.2 mg/dL   C Reactive Protein   Result Value Ref Range    C-Reactive Protein 8.5 (H) <=1.0 mg/dL   CBC No Differential   Result Value Ref Range    WBC 6.6 4.2 - 11.0 K/mcL    RBC 3.58 (L) 4.00 - 5.20 mil/mcL    HGB 10.9 (L) 12.0 - 15.5 g/dL    HCT 33.5 (L) 36.0 - 46.5 %    MCV 93.6 78.0 - 100.0 fl    MCH 30.4 26.0 - 34.0 pg    MCHC 32.5 32.0 - 36.5 g/dL     140 - 450 K/mcL    RDW-CV 15.3 (H) 11.0 - 15.0 %    RDW-SD 52.5 (H) 39.0 - 50.0 fL    NRBC 0 <=0 /100 WBC   C Reactive Protein   Result Value Ref Range    C-Reactive Protein 3.5 (H) <=1.0 mg/dL   Basic Metabolic Panel   Result Value Ref Range    Fasting Status      Sodium 141 135 - 145 mmol/L    Potassium 3.5 3.4 - 5.1 mmol/L    Chloride 105 97 - 110 mmol/L    Carbon Dioxide 26 21 - 32 mmol/L    Anion Gap 14 7 - 19 mmol/L    Glucose 98 70 - 99 mg/dL    BUN 12 6 - 20 mg/dL    Creatinine 0.61 0.51 - 0.95 mg/dL    Glomerular Filtration Rate 90 >=60    BUN/Cr 20 7 - 25    Calcium 9.4 8.4 - 10.2 mg/dL   CBC No Differential   Result Value Ref Range    WBC 7.5 4.2 - 11.0 K/mcL    RBC 3.79 (L) 4.00 - 5.20 mil/mcL    HGB 11.5 (L) 12.0 - 15.5 g/dL    HCT 34.9 (L) 36.0 - 46.5 %    MCV 92.1 78.0 - 100.0 fl    MCH 30.3 26.0 - 34.0 pg    MCHC 33.0 32.0 - 36.5 g/dL     140 - 450 K/mcL    RDW-CV 15.3 (H) 11.0 - 15.0 %    RDW-SD 51.1 (H) 39.0 - 50.0 fL    NRBC 0 <=0 /100 WBC   Urine, Bacterial Culture    Specimen: Urine clean catch   Result Value Ref Range    Urine, Bacterial Culture       10,000 - 50,000 CFU/mL Mixed bacterial lucille with no predominating type       EKG       Radiology Results     Imaging Results          CT ABDOMEN PELVIS W  CONTRAST (Final result)  Result time 07/28/23 19:33:30    Final result                 Impression:    IMPRESSION:     1.   Progressive circumferential thickening and adjacent inflammation involving the distal descending colon/proximal sigmoid colon, possibly relating to segmental colitis or diverticulitis.  No organized peripherally enhancing abscess or dionicio   pneumoperitoneum.  Colonoscopy after acute phase management can be considered to exclude underlying inflammatory neoplasm.    2.   Large proximal colonic stool burden, possibly relating to constipation/delayed transit.    3.   Unchanged 3.8 cm simple appearing left adnexal cyst, sonographic follow-up in 6-8 weeks is again suggested.    4.   Unchanged 0.8 cm pancreatic tail cyst with punctate peripheral calcification.  Repeat CT or MRI in 6 months suggested to document stability per [place] guidelines.    5.    Indwelling pessary device with portion of protruding along the rectum, to be correlated with desired placement.      6.   Additional unchanged findings as above.            Electronically Signed by: Damir Bowie MD   Signed on: 7/28/2023 7:33 PM   Workstation ID: [id number]             Narrative:    CT ABDOMEN PELVIS W CONTRAST    CLINICAL INFORMATION:  81 years-old Female with history of Abdominal abscess/infection suspected.    COMPARISON: 7/1/2023.    TECHNIQUE:  Using a multidetector, multislice helical CT imaging system, CT of the abdomen and pelvis is performed following administration of IV contrast as a dynamic bolus of 100 cc of Omnipaque 300.  No enteric contrast administered.  Evaluation of   the bowel is therefore suboptimal in this setting.  Coronal and sagittal multiplanar reformats.    FINDINGS:    Partially imaged lower thorax: Left mastectomy suggested.  Minor dependent atelectasis noted in both lung bases.    Liver: Redemonstrated simple appearing right hepatic cyst.  Otherwise unremarkable.    Spleen: Normal.    Gallbladder: Normal, no  intraluminal high attenuation foci.    Biliary: Normal.    Pancreas: Unchanged small 0.8 cm cystic focus along the pancreatic tail with punctate peripheral mural calcification.  Otherwise unremarkable.    Adrenals: Normal.    Kidneys, Urinary tract: Normal renal contours.  Redemonstrated simple appearing exophytic 2.5 cm left superior renal pole cyst.  Additional small subcentimeter hypodensities involving the bilateral kidneys are too small to definitively characterize but   likely cyst.  No evidence of urolithiasis or secondary signs of urinary tract obstruction.    Aorta, IVC: Normal in course and caliber.    Stomach, Duodenum: Normal    Small bowel, Mesentery: Small hiatal hernia suggested.    Appendix:  Appendix surgically absent.     Large Bowel, Rectosigmoid: Mild interval increase in diffuse circumferential thickening of the distal descending colon/proximal sigmoid colon with increased adjacent fat stranding, bowel wall edema and possible surrounding fluid.  Mild interval increase   in free fluid within the pelvis.  Underlying colonic diverticuli suggested.  No dionicio pneumoperitoneum.  Large proximal colonic stool burden.     : Redemonstrated simple appearing 3.8 cm left adnexal cyst.  Pelvic structures are obscured by streak artifact from bilateral hip arthroplasties.    Lymph nodes: A few small nonpathologically lymph nodes are present along the left lower abdomen and retroperitoneum, likely reactive.  No suspicious lymphadenopathy.    Osseous: Mild levoconvex curvature.  Mild stepwise retrolisthesis at T12-L2.  Moderate lumbar spondylosis.    Additional: Indwelling pessary device with portion of protruding along the rectum.                                  ED Medications     ED Medication Orders (From admission, onward)    Ordered Start     Status Ordering Provider    07/28/23 1937 07/28/23 1938  piperacillin-tazobactam (ZOSYN) 3.375 g in sodium chloride 0.9 % 100 mL IVPB  ONCE         Last MAR action:  Completed ALEX MERCHANTKEESHA NEWTON    07/28/23 1910 07/28/23 1911  sodium chloride (NORMAL SALINE) 0.9 % bolus 1,000 mL  ONCE         Last MAR action: Completed ALEX MERCHANTKEESHA NEWTON    07/28/23 1732 07/28/23 1733  ondansetron (ZOFRAN) injection 4 mg  ONCE         Last MAR action: Given MICHAEL IRVING    07/28/23 1732 07/28/23 1733  fentaNYL (SUBLIMAZE) injection 25 mcg  ONCE         Last MAR action: Given MICHAEL IRVING          ED Course     Vitals:    07/31/23 0335 07/31/23 0514 07/31/23 0702 07/31/23 1106   BP: 137/54  (!) 173/78 (!) 157/82   BP Location: RUE - Right upper extremity  RUE - Right upper extremity RUE - Right upper extremity   Patient Position: Semi-Jimenez's  Sitting Sitting   Pulse: 61  60 65   Resp: 16  16 17   Temp: 97.8 °F (36.6 °C)  97.9 °F (36.6 °C) 97.7 °F (36.5 °C)   TempSrc: Oral  Oral Oral   SpO2: 95%  98% 97%   Weight:  62.5 kg (137 lb 12.6 oz)     Height:                Consults                  Medical Decision Making                               81-year-old female presenting to the emergency room with her  with report of acute onset abdominal pain today with nausea and diarrhea.  She was diagnosed July 1st with diverticulitis and completed a 10 day course of Augmentin.  She states she did have difficulties with diarrhea while on that medication, she had to take a probiotic.  She states her bowels have just gone back to normal and then today she had abdominal pain and the start of brown pudding like stool.  She denies seeing any blood in the stool.  She reports her pain is a 4:10 and hard to describe but she demonstrates moderately tender abdomen.  Differential diagnosis diverticulitis rule out abscess, perforation, C. difficile with recent antibiotic usage    Results:   IV is established.  Patient does request something for pain so she does receive Zofran and she received 25 mics of fentanyl because she appears significantly uncomfortable.    CBC demonstrates WBCs of 14.4  hemoglobin stable at 12.3 platelets are stable   CMP stable   Lactic acid 1.2 lipase stable   Patient left in care supervising physician for final results of CT and disposition    MDM done in ED Course    Does the Patient have sepsis: NO     Critical Care       No Critical Care        Disposition               Admit 7/28/2023  8:12 PM  Telemetry Bed?: No  Admitting Physician: YONY DRUMMOND [[id number]]  Is this a telephone or verbal order?: This is a telephone order from the admitting physician                   Mamta Thurman PA-C  08/01/23 1257